# Patient Record
Sex: MALE | Race: WHITE | NOT HISPANIC OR LATINO
[De-identification: names, ages, dates, MRNs, and addresses within clinical notes are randomized per-mention and may not be internally consistent; named-entity substitution may affect disease eponyms.]

---

## 2022-08-17 ENCOUNTER — NON-APPOINTMENT (OUTPATIENT)
Age: 77
End: 2022-08-17

## 2022-10-21 ENCOUNTER — INPATIENT (INPATIENT)
Facility: HOSPITAL | Age: 77
LOS: 4 days | Discharge: HOME | DRG: 191 | End: 2022-10-26
Attending: HOSPITALIST | Admitting: HOSPITALIST
Payer: MEDICARE

## 2022-10-21 VITALS
HEIGHT: 66 IN | RESPIRATION RATE: 22 BRPM | SYSTOLIC BLOOD PRESSURE: 178 MMHG | TEMPERATURE: 97 F | WEIGHT: 198.86 LBS | DIASTOLIC BLOOD PRESSURE: 82 MMHG | OXYGEN SATURATION: 95 % | HEART RATE: 70 BPM

## 2022-10-21 LAB
ALBUMIN SERPL ELPH-MCNC: 3.9 G/DL — SIGNIFICANT CHANGE UP (ref 3.3–5)
ALP SERPL-CCNC: 97 U/L — SIGNIFICANT CHANGE UP (ref 30–120)
ALT FLD-CCNC: 12 U/L DA — SIGNIFICANT CHANGE UP (ref 10–60)
ANION GAP SERPL CALC-SCNC: 10 MMOL/L — SIGNIFICANT CHANGE UP (ref 5–17)
APTT BLD: 29.3 SEC — SIGNIFICANT CHANGE UP (ref 27.5–35.5)
AST SERPL-CCNC: 12 U/L — SIGNIFICANT CHANGE UP (ref 10–40)
BASOPHILS # BLD AUTO: 0.1 K/UL — SIGNIFICANT CHANGE UP (ref 0–0.2)
BASOPHILS NFR BLD AUTO: 1.2 % — SIGNIFICANT CHANGE UP (ref 0–2)
BILIRUB SERPL-MCNC: 0.8 MG/DL — SIGNIFICANT CHANGE UP (ref 0.2–1.2)
BUN SERPL-MCNC: 21 MG/DL — SIGNIFICANT CHANGE UP (ref 7–23)
CALCIUM SERPL-MCNC: 9.1 MG/DL — SIGNIFICANT CHANGE UP (ref 8.4–10.5)
CHLORIDE SERPL-SCNC: 103 MMOL/L — SIGNIFICANT CHANGE UP (ref 96–108)
CO2 SERPL-SCNC: 29 MMOL/L — SIGNIFICANT CHANGE UP (ref 22–31)
CREAT SERPL-MCNC: 1.35 MG/DL — HIGH (ref 0.5–1.3)
EGFR: 54 ML/MIN/1.73M2 — LOW
EOSINOPHIL # BLD AUTO: 1.41 K/UL — HIGH (ref 0–0.5)
EOSINOPHIL NFR BLD AUTO: 16.8 % — HIGH (ref 0–6)
GLUCOSE SERPL-MCNC: 110 MG/DL — HIGH (ref 70–99)
HCT VFR BLD CALC: 40.3 % — SIGNIFICANT CHANGE UP (ref 39–50)
HGB BLD-MCNC: 13.3 G/DL — SIGNIFICANT CHANGE UP (ref 13–17)
IMM GRANULOCYTES NFR BLD AUTO: 0.1 % — SIGNIFICANT CHANGE UP (ref 0–0.9)
INR BLD: 1.1 RATIO — SIGNIFICANT CHANGE UP (ref 0.88–1.16)
LIDOCAIN IGE QN: 152 U/L — SIGNIFICANT CHANGE UP (ref 73–393)
LYMPHOCYTES # BLD AUTO: 1.93 K/UL — SIGNIFICANT CHANGE UP (ref 1–3.3)
LYMPHOCYTES # BLD AUTO: 23 % — SIGNIFICANT CHANGE UP (ref 13–44)
MCHC RBC-ENTMCNC: 30.5 PG — SIGNIFICANT CHANGE UP (ref 27–34)
MCHC RBC-ENTMCNC: 33 GM/DL — SIGNIFICANT CHANGE UP (ref 32–36)
MCV RBC AUTO: 92.4 FL — SIGNIFICANT CHANGE UP (ref 80–100)
MONOCYTES # BLD AUTO: 0.68 K/UL — SIGNIFICANT CHANGE UP (ref 0–0.9)
MONOCYTES NFR BLD AUTO: 8.1 % — SIGNIFICANT CHANGE UP (ref 2–14)
NEUTROPHILS # BLD AUTO: 4.27 K/UL — SIGNIFICANT CHANGE UP (ref 1.8–7.4)
NEUTROPHILS NFR BLD AUTO: 50.8 % — SIGNIFICANT CHANGE UP (ref 43–77)
NRBC # BLD: 0 /100 WBCS — SIGNIFICANT CHANGE UP (ref 0–0)
NT-PROBNP SERPL-SCNC: 962 PG/ML — HIGH (ref 0–450)
PLATELET # BLD AUTO: 194 K/UL — SIGNIFICANT CHANGE UP (ref 150–400)
POTASSIUM SERPL-MCNC: 3.8 MMOL/L — SIGNIFICANT CHANGE UP (ref 3.5–5.3)
POTASSIUM SERPL-SCNC: 3.8 MMOL/L — SIGNIFICANT CHANGE UP (ref 3.5–5.3)
PROT SERPL-MCNC: 7.8 G/DL — SIGNIFICANT CHANGE UP (ref 6–8.3)
PROTHROM AB SERPL-ACNC: 12.7 SEC — SIGNIFICANT CHANGE UP (ref 10.5–13.4)
RBC # BLD: 4.36 M/UL — SIGNIFICANT CHANGE UP (ref 4.2–5.8)
RBC # FLD: 14 % — SIGNIFICANT CHANGE UP (ref 10.3–14.5)
SARS-COV-2 RNA SPEC QL NAA+PROBE: SIGNIFICANT CHANGE UP
SODIUM SERPL-SCNC: 142 MMOL/L — SIGNIFICANT CHANGE UP (ref 135–145)
TROPONIN I, HIGH SENSITIVITY RESULT: 25.6 NG/L — SIGNIFICANT CHANGE UP
TROPONIN I, HIGH SENSITIVITY RESULT: 27.7 NG/L — SIGNIFICANT CHANGE UP
WBC # BLD: 8.4 K/UL — SIGNIFICANT CHANGE UP (ref 3.8–10.5)
WBC # FLD AUTO: 8.4 K/UL — SIGNIFICANT CHANGE UP (ref 3.8–10.5)

## 2022-10-21 PROCEDURE — 93010 ELECTROCARDIOGRAM REPORT: CPT

## 2022-10-21 PROCEDURE — 99285 EMERGENCY DEPT VISIT HI MDM: CPT | Mod: CS

## 2022-10-21 PROCEDURE — 71045 X-RAY EXAM CHEST 1 VIEW: CPT | Mod: 26

## 2022-10-21 NOTE — ED PROVIDER NOTE - OBJECTIVE STATEMENT
76-year-old male history of CAD with stents pacemaker on Plavix complaining about 2 to 3 days of dyspnea on exertion.  Moderate to severe.  States he is unable to walk without getting short of breath now.  No fever no chills no upper respiratory symptoms.  No chest pain.

## 2022-10-21 NOTE — ED PROVIDER NOTE - PHYSICAL EXAMINATION
Gen: Alert, NAD  Head/eyes: NC/AT, PERRL  ENT: airway patent  Neck: supple  Pulm/lung: Bilateral clear BS  CV/heart: RRR  GI/Abd: soft, NT/ND, +BS, no guarding/rebound tenderness  Musculoskeletal: no edema/erythema/cyanosis  Skin: no rash  Neuro: AAOx3, grossly intact Yes

## 2022-10-21 NOTE — ED ADULT NURSE NOTE - OBJECTIVE STATEMENT
77 yo M pmh of COPD, bundle branch block with pacemaker ambulated to ED c/o difficulty breathing.  Pt c/o worsening SOB x 2-3 days.  Pt states that he is unable to tolerate normal activities since onset of symptoms.  Denies CP, back pain, fevers/chills, n/v/d, lightheadedness, dizziness, changes in urinary or bowel habits.  A&Ox4, EKG performed, placed on CM, tele tech aware.  Pt mildly tachypneic on exam, with noted moist, non-productive cough.  skin w/d/i, noted +peripheral pulses.  VSS except for HTN.  Safety maintained, bed in lowest position, side rails raised. IV established, and lab specimens sent.  pending results.

## 2022-10-21 NOTE — ED PROVIDER NOTE - CARE PLAN
Dulaglutide (By injection)   Dulaglutide (doo-la-GLOO-tide)  Treats type 2 diabetes. Brand Name(s): Trulicity   There may be other brand names for this medicine. When This Medicine Should Not Be Used: This medicine is not right for everyone. Do not use it if you had an allergic reaction to dulaglutide, you have multiple endocrine neoplasia syndrome type 2 (MEN 2), or you or anyone in your family has had medullary thyroid cancer. How to Use This Medicine:   Injectable  · Your doctor will prescribe your exact dose. This medicine is usually given once a week, on the same day of the week. It is given as a shot under the skin of your stomach, thighs, or upper arms. · You may be taught how to give your medicine at home. Make sure you understand all instructions before giving yourself an injection. Do not use more medicine or use it more often than your doctor tells you to. · If you use insulin in addition to this medicine, do not mix them in the same syringe. You may give the shots in the same area (such as your stomach), but do not give the shots right next to each other. · If the medicine in the pen or prefilled syringe has changed color, looks cloudy, or has particles in it, do not use it. · You will be shown the body areas where this shot can be given. Use a different body area each time you give yourself a shot. Keep track of where you give each shot to make sure you rotate body areas. · Use a new needle and syringe each time you inject your medicine. · This medicine should come with a Medication Guide. Ask your pharmacist for a copy if you do not have one. · Missed dose: Use a missed dose as soon as possible if there are at least 3 days before your next dose is due. If your next dose is due in less than 3 days, then skip the missed dose. · Store your medicine pens or prefilled syringes in the refrigerator and keep them in the original carton. Protect the pens from light.  You may also store the pens at room temperature for up to 14 days. Do not freeze the medicine, and do not use the medicine if it has been frozen. Drugs and Foods to Avoid:      Ask your doctor or pharmacist before using any other medicine, including over-the-counter medicines, vitamins, and herbal products. Warnings While Using This Medicine:   · Tell your doctor if you are pregnant or breastfeeding, or if you have kidney disease or a history of pancreas problems. Tell your doctor if you have severe digestion problems (such as gastroparesis) or stomach or bowel problems. · This medicine may cause the following problems:  ¨ Increased risk of thyroid tumor  ¨ Pancreatitis  ¨ Low blood sugar (more likely if you also take insulin or other diabetes medicine)  · Your doctor will do lab tests at regular visits to check on the effects of this medicine. Keep all appointments. · Keep all medicine out of the reach of children. Never share your medicine with anyone. Do not share needles or pens because you can spread an infection. Possible Side Effects While Using This Medicine:   Call your doctor right away if you notice any of these side effects:  · Allergic reaction: Itching or hives, swelling in your face or hands, swelling or tingling in your mouth or throat, chest tightness, trouble breathing  · A lump or swelling in your neck, trouble breathing or swallowing  · Change in how much or how often you urinate  · Shaking, trembling, sweating, fast or pounding heartbeat, lightheadedness, hunger, confusion  · Sudden and severe stomach pain, nausea, vomiting, fever, and lightheadedness  If you notice these less serious side effects, talk with your doctor:   · Diarrhea  · Redness, itching, swelling, or any changes in your skin where the shot was given  If you notice other side effects that you think are caused by this medicine, tell your doctor. Call your doctor for medical advice about side effects.  You may report side effects to FDA at 1-800-FDA-1088  © 2017 [facility] Information is for End User's use only and may not be sold, redistributed or otherwise used for commercial purposes. The above information is an  only. It is not intended as medical advice for individual conditions or treatments. Talk to your doctor, nurse or pharmacist before following any medical regimen to see if it is safe and effective for you. 1 Principal Discharge DX:	Dyspnea on exertion

## 2022-10-21 NOTE — ED PROVIDER NOTE - NS ED ROS FT
Constitutional: - Fever, - Chills, - Anorexia, - Fatigue, - Night sweats  Eyes: - Discharge, - Irritation, - Redness, - Visual changes, - Light sensitivity, - Pain  EARS: - Ear Pain, - Tinnitus, - Decreased hearing  NOSE: - Congestion, - Epistaxis  MOUTH/THROAT: - Vocal Changes, - Drooling, - Sore throat  NECK: - Lumps, - Stiffness, - Pain  CV: - Palpitations, - Chest Pain, - Edema, - Syncope  RESP:  - Cough, + Shortness of Breath, - Dyspnea on Exertion, - Trouble speaking, - Pleuritic pain - Wheezing  GI: - Diarrhea, - Constipation, - Bloody stools, - Nausea, - Vomiting, - Abdominal Pain  : - Dysuria, -Frequency, - Hematuria, - Hesitancy, - Incontinence, - Saddle Anesthesia, - Abnormal discharge  MSK: - Myalgias, - Arthralgias, - Weakness, - Deformities, - Injuries  SKIN: - Color change, - Rash, - Swelling, - Ecchymosis, - Abrasion, - laceration  NEURO: - Change in behavior, - Dec. Alertness, - Headache, - Dizziness, - Change in speech, - Weakness, - Seizure-like activity, - Difficulty ambulating

## 2022-10-22 DIAGNOSIS — R06.09 OTHER FORMS OF DYSPNEA: ICD-10-CM

## 2022-10-22 DIAGNOSIS — Z95.0 PRESENCE OF CARDIAC PACEMAKER: Chronic | ICD-10-CM

## 2022-10-22 DIAGNOSIS — Z90.49 ACQUIRED ABSENCE OF OTHER SPECIFIED PARTS OF DIGESTIVE TRACT: Chronic | ICD-10-CM

## 2022-10-22 LAB
A1C WITH ESTIMATED AVERAGE GLUCOSE RESULT: 5.9 % — HIGH (ref 4–5.6)
ALBUMIN SERPL ELPH-MCNC: 2.9 G/DL — LOW (ref 3.3–5)
ALP SERPL-CCNC: 82 U/L — SIGNIFICANT CHANGE UP (ref 30–120)
ALT FLD-CCNC: <10 U/L DA — LOW (ref 10–60)
ANION GAP SERPL CALC-SCNC: 10 MMOL/L — SIGNIFICANT CHANGE UP (ref 5–17)
AST SERPL-CCNC: 11 U/L — SIGNIFICANT CHANGE UP (ref 10–40)
BASOPHILS # BLD AUTO: 0.02 K/UL — SIGNIFICANT CHANGE UP (ref 0–0.2)
BASOPHILS NFR BLD AUTO: 0.4 % — SIGNIFICANT CHANGE UP (ref 0–2)
BILIRUB SERPL-MCNC: 0.6 MG/DL — SIGNIFICANT CHANGE UP (ref 0.2–1.2)
BUN SERPL-MCNC: 20 MG/DL — SIGNIFICANT CHANGE UP (ref 7–23)
CALCIUM SERPL-MCNC: 8.5 MG/DL — SIGNIFICANT CHANGE UP (ref 8.4–10.5)
CHLORIDE SERPL-SCNC: 101 MMOL/L — SIGNIFICANT CHANGE UP (ref 96–108)
CO2 SERPL-SCNC: 25 MMOL/L — SIGNIFICANT CHANGE UP (ref 22–31)
CREAT SERPL-MCNC: 1.06 MG/DL — SIGNIFICANT CHANGE UP (ref 0.5–1.3)
CRP SERPL-MCNC: 14 MG/L — HIGH
CRP SERPL-MCNC: 16 MG/L — HIGH
EGFR: 72 ML/MIN/1.73M2 — SIGNIFICANT CHANGE UP
EOSINOPHIL # BLD AUTO: 0.01 K/UL — SIGNIFICANT CHANGE UP (ref 0–0.5)
EOSINOPHIL NFR BLD AUTO: 0.2 % — SIGNIFICANT CHANGE UP (ref 0–6)
ERYTHROCYTE [SEDIMENTATION RATE] IN BLOOD: 21 MM/HR — HIGH (ref 0–9)
ESTIMATED AVERAGE GLUCOSE: 123 MG/DL — HIGH (ref 68–114)
GLUCOSE SERPL-MCNC: 156 MG/DL — HIGH (ref 70–99)
HCT VFR BLD CALC: 38.6 % — LOW (ref 39–50)
HGB BLD-MCNC: 12.5 G/DL — LOW (ref 13–17)
IMM GRANULOCYTES NFR BLD AUTO: 0.4 % — SIGNIFICANT CHANGE UP (ref 0–0.9)
LYMPHOCYTES # BLD AUTO: 0.59 K/UL — LOW (ref 1–3.3)
LYMPHOCYTES # BLD AUTO: 12.9 % — LOW (ref 13–44)
MAGNESIUM SERPL-MCNC: 1.6 MG/DL — SIGNIFICANT CHANGE UP (ref 1.6–2.6)
MCHC RBC-ENTMCNC: 29.6 PG — SIGNIFICANT CHANGE UP (ref 27–34)
MCHC RBC-ENTMCNC: 32.4 GM/DL — SIGNIFICANT CHANGE UP (ref 32–36)
MCV RBC AUTO: 91.5 FL — SIGNIFICANT CHANGE UP (ref 80–100)
MONOCYTES # BLD AUTO: 0.04 K/UL — SIGNIFICANT CHANGE UP (ref 0–0.9)
MONOCYTES NFR BLD AUTO: 0.9 % — LOW (ref 2–14)
NEUTROPHILS # BLD AUTO: 3.89 K/UL — SIGNIFICANT CHANGE UP (ref 1.8–7.4)
NEUTROPHILS NFR BLD AUTO: 85.2 % — HIGH (ref 43–77)
NRBC # BLD: 0 /100 WBCS — SIGNIFICANT CHANGE UP (ref 0–0)
PHOSPHATE SERPL-MCNC: 2 MG/DL — LOW (ref 2.5–4.5)
PLATELET # BLD AUTO: 181 K/UL — SIGNIFICANT CHANGE UP (ref 150–400)
POTASSIUM SERPL-MCNC: 4.1 MMOL/L — SIGNIFICANT CHANGE UP (ref 3.5–5.3)
POTASSIUM SERPL-SCNC: 4.1 MMOL/L — SIGNIFICANT CHANGE UP (ref 3.5–5.3)
PROCALCITONIN SERPL-MCNC: 0.08 NG/ML — SIGNIFICANT CHANGE UP (ref 0.02–0.1)
PROCALCITONIN SERPL-MCNC: 0.08 NG/ML — SIGNIFICANT CHANGE UP (ref 0.02–0.1)
PROT SERPL-MCNC: 6.2 G/DL — SIGNIFICANT CHANGE UP (ref 6–8.3)
RAPID RVP RESULT: SIGNIFICANT CHANGE UP
RBC # BLD: 4.22 M/UL — SIGNIFICANT CHANGE UP (ref 4.2–5.8)
RBC # FLD: 13.9 % — SIGNIFICANT CHANGE UP (ref 10.3–14.5)
SARS-COV-2 RNA SPEC QL NAA+PROBE: SIGNIFICANT CHANGE UP
SODIUM SERPL-SCNC: 136 MMOL/L — SIGNIFICANT CHANGE UP (ref 135–145)
TROPONIN I, HIGH SENSITIVITY RESULT: 23.7 NG/L — SIGNIFICANT CHANGE UP
WBC # BLD: 4.57 K/UL — SIGNIFICANT CHANGE UP (ref 3.8–10.5)
WBC # FLD AUTO: 4.57 K/UL — SIGNIFICANT CHANGE UP (ref 3.8–10.5)

## 2022-10-22 PROCEDURE — 71275 CT ANGIOGRAPHY CHEST: CPT | Mod: 26

## 2022-10-22 PROCEDURE — 12345: CPT | Mod: NC

## 2022-10-22 PROCEDURE — 99223 1ST HOSP IP/OBS HIGH 75: CPT | Mod: AI

## 2022-10-22 RX ORDER — ASPIRIN/CALCIUM CARB/MAGNESIUM 324 MG
1 TABLET ORAL
Qty: 0 | Refills: 0 | DISCHARGE

## 2022-10-22 RX ORDER — ENOXAPARIN SODIUM 100 MG/ML
40 INJECTION SUBCUTANEOUS EVERY 24 HOURS
Refills: 0 | Status: DISCONTINUED | OUTPATIENT
Start: 2022-10-22 | End: 2022-10-26

## 2022-10-22 RX ORDER — TIOTROPIUM BROMIDE 18 UG/1
1 CAPSULE ORAL; RESPIRATORY (INHALATION) DAILY
Refills: 0 | Status: DISCONTINUED | OUTPATIENT
Start: 2022-10-22 | End: 2022-10-26

## 2022-10-22 RX ORDER — DEXTROSE 50 % IN WATER 50 %
25 SYRINGE (ML) INTRAVENOUS ONCE
Refills: 0 | Status: DISCONTINUED | OUTPATIENT
Start: 2022-10-22 | End: 2022-10-26

## 2022-10-22 RX ORDER — GLUCAGON INJECTION, SOLUTION 0.5 MG/.1ML
1 INJECTION, SOLUTION SUBCUTANEOUS ONCE
Refills: 0 | Status: DISCONTINUED | OUTPATIENT
Start: 2022-10-22 | End: 2022-10-26

## 2022-10-22 RX ORDER — IPRATROPIUM/ALBUTEROL SULFATE 18-103MCG
3 AEROSOL WITH ADAPTER (GRAM) INHALATION ONCE
Refills: 0 | Status: COMPLETED | OUTPATIENT
Start: 2022-10-22 | End: 2022-10-22

## 2022-10-22 RX ORDER — ASPIRIN/CALCIUM CARB/MAGNESIUM 324 MG
81 TABLET ORAL DAILY
Refills: 0 | Status: DISCONTINUED | OUTPATIENT
Start: 2022-10-22 | End: 2022-10-26

## 2022-10-22 RX ORDER — ALBUTEROL 90 UG/1
2 AEROSOL, METERED ORAL
Qty: 0 | Refills: 0 | DISCHARGE

## 2022-10-22 RX ORDER — CEFTRIAXONE 500 MG/1
INJECTION, POWDER, FOR SOLUTION INTRAMUSCULAR; INTRAVENOUS
Refills: 0 | Status: DISCONTINUED | OUTPATIENT
Start: 2022-10-22 | End: 2022-10-22

## 2022-10-22 RX ORDER — SODIUM CHLORIDE 9 MG/ML
1000 INJECTION, SOLUTION INTRAVENOUS
Refills: 0 | Status: DISCONTINUED | OUTPATIENT
Start: 2022-10-22 | End: 2022-10-26

## 2022-10-22 RX ORDER — ONDANSETRON 8 MG/1
4 TABLET, FILM COATED ORAL EVERY 8 HOURS
Refills: 0 | Status: DISCONTINUED | OUTPATIENT
Start: 2022-10-22 | End: 2022-10-26

## 2022-10-22 RX ORDER — BUDESONIDE AND FORMOTEROL FUMARATE DIHYDRATE 160; 4.5 UG/1; UG/1
2 AEROSOL RESPIRATORY (INHALATION)
Refills: 0 | Status: DISCONTINUED | OUTPATIENT
Start: 2022-10-22 | End: 2022-10-26

## 2022-10-22 RX ORDER — CEFTRIAXONE 500 MG/1
1000 INJECTION, POWDER, FOR SOLUTION INTRAMUSCULAR; INTRAVENOUS ONCE
Refills: 0 | Status: COMPLETED | OUTPATIENT
Start: 2022-10-22 | End: 2022-10-22

## 2022-10-22 RX ORDER — AMLODIPINE BESYLATE 2.5 MG/1
5 TABLET ORAL DAILY
Refills: 0 | Status: DISCONTINUED | OUTPATIENT
Start: 2022-10-22 | End: 2022-10-26

## 2022-10-22 RX ORDER — METFORMIN HYDROCHLORIDE 850 MG/1
1 TABLET ORAL
Qty: 0 | Refills: 0 | DISCHARGE

## 2022-10-22 RX ORDER — CLOPIDOGREL BISULFATE 75 MG/1
1 TABLET, FILM COATED ORAL
Qty: 0 | Refills: 0 | DISCHARGE

## 2022-10-22 RX ORDER — FLUTICASONE PROPIONATE 220 MCG
1 AEROSOL WITH ADAPTER (GRAM) INHALATION
Qty: 0 | Refills: 0 | DISCHARGE

## 2022-10-22 RX ORDER — LANOLIN ALCOHOL/MO/W.PET/CERES
3 CREAM (GRAM) TOPICAL AT BEDTIME
Refills: 0 | Status: DISCONTINUED | OUTPATIENT
Start: 2022-10-22 | End: 2022-10-26

## 2022-10-22 RX ORDER — ASCORBIC ACID 60 MG
1 TABLET,CHEWABLE ORAL
Qty: 0 | Refills: 0 | DISCHARGE

## 2022-10-22 RX ORDER — DEXTROSE 50 % IN WATER 50 %
12.5 SYRINGE (ML) INTRAVENOUS ONCE
Refills: 0 | Status: DISCONTINUED | OUTPATIENT
Start: 2022-10-22 | End: 2022-10-26

## 2022-10-22 RX ORDER — ALBUTEROL 90 UG/1
2.5 AEROSOL, METERED ORAL EVERY 8 HOURS
Refills: 0 | Status: DISCONTINUED | OUTPATIENT
Start: 2022-10-22 | End: 2022-10-26

## 2022-10-22 RX ORDER — DEXTROSE 50 % IN WATER 50 %
15 SYRINGE (ML) INTRAVENOUS ONCE
Refills: 0 | Status: DISCONTINUED | OUTPATIENT
Start: 2022-10-22 | End: 2022-10-26

## 2022-10-22 RX ORDER — VALSARTAN 80 MG/1
80 TABLET ORAL DAILY
Refills: 0 | Status: DISCONTINUED | OUTPATIENT
Start: 2022-10-22 | End: 2022-10-22

## 2022-10-22 RX ORDER — OMEGA-3 ACID ETHYL ESTERS 1 G
2 CAPSULE ORAL DAILY
Refills: 0 | Status: DISCONTINUED | OUTPATIENT
Start: 2022-10-22 | End: 2022-10-26

## 2022-10-22 RX ORDER — ACETAMINOPHEN 500 MG
650 TABLET ORAL EVERY 6 HOURS
Refills: 0 | Status: DISCONTINUED | OUTPATIENT
Start: 2022-10-22 | End: 2022-10-26

## 2022-10-22 RX ORDER — BUDESONIDE, GLYCOPYRROLATE, AND FORMOTEROL FUMARATE 160; 9; 4.8 UG/1; UG/1; UG/1
2 AEROSOL, METERED RESPIRATORY (INHALATION)
Qty: 0 | Refills: 0 | DISCHARGE

## 2022-10-22 RX ORDER — SODIUM CHLORIDE 9 MG/ML
1000 INJECTION, SOLUTION INTRAVENOUS
Refills: 0 | Status: DISCONTINUED | OUTPATIENT
Start: 2022-10-22 | End: 2022-10-25

## 2022-10-22 RX ORDER — OMEGA-3 ACID ETHYL ESTERS 1 G
1 CAPSULE ORAL
Qty: 0 | Refills: 0 | DISCHARGE

## 2022-10-22 RX ORDER — CLOPIDOGREL BISULFATE 75 MG/1
75 TABLET, FILM COATED ORAL DAILY
Refills: 0 | Status: DISCONTINUED | OUTPATIENT
Start: 2022-10-22 | End: 2022-10-26

## 2022-10-22 RX ORDER — IPRATROPIUM/ALBUTEROL SULFATE 18-103MCG
3 AEROSOL WITH ADAPTER (GRAM) INHALATION EVERY 6 HOURS
Refills: 0 | Status: DISCONTINUED | OUTPATIENT
Start: 2022-10-22 | End: 2022-10-22

## 2022-10-22 RX ORDER — RAMELTEON 8 MG
1 TABLET ORAL
Qty: 0 | Refills: 0 | DISCHARGE

## 2022-10-22 RX ORDER — INSULIN LISPRO 100/ML
VIAL (ML) SUBCUTANEOUS
Refills: 0 | Status: DISCONTINUED | OUTPATIENT
Start: 2022-10-22 | End: 2022-10-26

## 2022-10-22 RX ORDER — INSULIN LISPRO 100/ML
VIAL (ML) SUBCUTANEOUS AT BEDTIME
Refills: 0 | Status: DISCONTINUED | OUTPATIENT
Start: 2022-10-22 | End: 2022-10-26

## 2022-10-22 RX ORDER — AMLODIPINE BESYLATE 2.5 MG/1
1 TABLET ORAL
Qty: 0 | Refills: 0 | DISCHARGE

## 2022-10-22 RX ORDER — ASCORBIC ACID 60 MG
500 TABLET,CHEWABLE ORAL DAILY
Refills: 0 | Status: DISCONTINUED | OUTPATIENT
Start: 2022-10-22 | End: 2022-10-26

## 2022-10-22 RX ADMIN — Medication 20 MILLIGRAM(S): at 12:52

## 2022-10-22 RX ADMIN — Medication 1: at 12:50

## 2022-10-22 RX ADMIN — Medication 125 MILLIGRAM(S): at 00:09

## 2022-10-22 RX ADMIN — ALBUTEROL 2.5 MILLIGRAM(S): 90 AEROSOL, METERED ORAL at 15:24

## 2022-10-22 RX ADMIN — Medication 1: at 08:15

## 2022-10-22 RX ADMIN — ENOXAPARIN SODIUM 40 MILLIGRAM(S): 100 INJECTION SUBCUTANEOUS at 05:14

## 2022-10-22 RX ADMIN — Medication 2 GRAM(S): at 12:53

## 2022-10-22 RX ADMIN — Medication 1 TABLET(S): at 12:52

## 2022-10-22 RX ADMIN — CLOPIDOGREL BISULFATE 75 MILLIGRAM(S): 75 TABLET, FILM COATED ORAL at 12:51

## 2022-10-22 RX ADMIN — ALBUTEROL 2.5 MILLIGRAM(S): 90 AEROSOL, METERED ORAL at 23:21

## 2022-10-22 RX ADMIN — Medication 40 MILLIGRAM(S): at 05:14

## 2022-10-22 RX ADMIN — AMLODIPINE BESYLATE 5 MILLIGRAM(S): 2.5 TABLET ORAL at 05:14

## 2022-10-22 RX ADMIN — Medication 500 MILLIGRAM(S): at 12:52

## 2022-10-22 RX ADMIN — Medication 81 MILLIGRAM(S): at 12:52

## 2022-10-22 RX ADMIN — Medication 3 MILLILITER(S): at 00:12

## 2022-10-22 RX ADMIN — SODIUM CHLORIDE 75 MILLILITER(S): 9 INJECTION, SOLUTION INTRAVENOUS at 05:13

## 2022-10-22 RX ADMIN — BUDESONIDE AND FORMOTEROL FUMARATE DIHYDRATE 2 PUFF(S): 160; 4.5 AEROSOL RESPIRATORY (INHALATION) at 21:55

## 2022-10-22 RX ADMIN — CEFTRIAXONE 100 MILLIGRAM(S): 500 INJECTION, POWDER, FOR SOLUTION INTRAMUSCULAR; INTRAVENOUS at 01:10

## 2022-10-22 NOTE — H&P ADULT - ASSESSMENT
76M with hx of COPD/emphysema, CAD s/p 2x stents, bradycardia s/p PPM, HTN, DM2 not on insulin who presents with SOB, ARNOLD, and wheezing.  Consistent with COPD exacerbation.      COPD exacerbation  - admitted to remote telemetry  - monitor O2 sat  - nebs PRN  - cont with steroids  - pulmonary consult  - check ESR, CRP, procalcitonin  - start ceftriaxone  - f/u cultures    Slight SHARI  - hold valsartan-HCTZ for now  - gentle hydration  - repeat BMP    CAD   - cont with plavix, ASA, amlodipine, atorvastatin  - holding valsartan-HCTZ    DM2   - hold metformin  - start low dose insulin coverage scale with FS qAC and qHS  - monitor glucose anderson while on steroids  - diabetic diet    Depression  - cont with paxil    Preventive measures  - lovenox for DVT ppx

## 2022-10-22 NOTE — H&P ADULT - NSHPREVIEWOFSYSTEMS_GEN_ALL_CORE
REVIEW OF SYSTEMS:  CONSTITUTIONAL:    no fever or weight loss or fatigue  EYES:    no eye pain or visual disturbances or discharge  ENMT:     no difficulty hearing or tinnitus or vertigo, no sinus or throat pain  NECK:    no pain or stiffness  RESPIRATORY:    +SOB/ARNOLD, +wheezing, no chills or hemoptysis, no shortness of breath  CARDIOVASCULAR:    no chest pain or palpitations or dizziness or leg swelling  GASTROINTESTINAL:    no abdominal or epigastric pain. no nausea or vomiting or hematemesis, no diarrhea or constipation. no melena or hematochezia.  GENITOURINARY:    no dysuria or frequency or hematuria or incontinence  NEUROLOGICAL:    no headaches or memory loss or loss of strength or numbness or tremors  SKIN:    no itching or burning or rashes or lesions   LYMPH NODES:    no enlarged glands  ENDOCRINE:    no heat or cold intolerance, no hair loss, no polydipsia or polyuria  MUSCULOSKELETAL:    no joint pain or swelling, no muscle or back or extremity pain  PSYCHIATRIC:    no depression or anxiety or mood swings or difficulty sleeping  HEME/LYMPH:    no easy bruising or bleeding gums  ALLERGY AND IMMUNOLOGIC:    no hives or eczema

## 2022-10-22 NOTE — PATIENT PROFILE ADULT - FALL HARM RISK - HARM RISK INTERVENTIONS

## 2022-10-22 NOTE — H&P ADULT - HISTORY OF PRESENT ILLNESS
76M with hx of COPD/emphysema, CAD s/p 2x stents, bradycardia s/p PPM, HTN, DM2 not on insulin who presents with SOB, ARNOLD, and wheezing.  Symptoms started today with the patient wheezing with worsening ARNOLD (has chronic ARNOLD).  Patient was able to get some albuterol inhaler  76M with hx of COPD/emphysema, CAD s/p 2x stents, bradycardia s/p PPM, HTN, DM2 not on insulin who presents with SOB, ARNOLD, and wheezing.  Symptoms started today with the patient wheezing with worsening ARNOLD (has chronic ARNOLD).  Patient had a pulse ox that showed as low as 86%.  Patient was able to get some albuterol inhaler which improved his symptoms.  However his breathing worsened after he finished the albuterol.  Decided to come here for further evaluation.  Patient denied any fevers, nasuea/vomiting/diarrhea.  Has chronic phelgm but has not worsened.  No sick contacts.  No recent travel.  No history of COPD exacerbation.  Patient denied getting his flu vaccine but did receive his COVID series plus one booster.  In the ED, patient's BP was 178/82, HR 70  RR 22, 95%, and T 97.4F.  Labs showed only slight SHARI at Cr 1.35 and .  Troponins neg x3.  COVID neg.  Patient started to wheeze again in the ED and therefore patient is being admitted for possible COPD exacerbation.  Currently feels better s/p neb treatment.

## 2022-10-22 NOTE — H&P ADULT - NSICDXPASTMEDICALHX_GEN_ALL_CORE_FT
PAST MEDICAL HISTORY:  Borderline type 2 diabetes mellitus     CAD (coronary artery disease) s/p 2 stents    COPD, moderate     Emphysema/COPD     H/O bundle branch block     Hypertension     Pacemaker

## 2022-10-22 NOTE — H&P ADULT - NSHPLABSRESULTS_GEN_ALL_CORE
LABS:                        13.3   8.40  )-----------( 194      ( 21 Oct 2022 22:07 )             40.3     142    |  103    |  21     ----------------------------<  110<H>    21 Oct 2022 22:07  3.8     |  29     |  1.35<H>        Ca 9.1           21 Oct 2022 22:07        TPro  7.8    /  Alb  3.9    /  TBili  0.8    /  DBili  x      /  AST  12     /  ALT  12     /  AlkPhos  97     21 Oct 2022 22:07    PT/INR - ( 21 Oct 2022 22:07 )   PT: 12.7 ;   INR: 1.10          PTT - ( 21 Oct 2022 22:07 )  PTT:29.3     Troponin trend:  Troponin I, High Sensitivity Result: 23.7 ng/L (10-22-22 @ 00:07)  Troponin I, High Sensitivity Result: 25.6 ng/L (10-21-22 @ 23:09)  Troponin I, High Sensitivity Result: 27.7 ng/L (10-21-22 @ 22:07)    EKG:  a-paced with PVCs  Radiology:  CXR- grossly clear

## 2022-10-22 NOTE — H&P ADULT - NSICDXFAMILYHX_GEN_ALL_CORE_FT
FAMILY HISTORY:  Father  Still living? Unknown  FH: emphysema, Age at diagnosis: Age Unknown    Mother  Still living? Unknown  FH: lung cancer, Age at diagnosis: Age Unknown

## 2022-10-22 NOTE — CHART NOTE - NSCHARTNOTEFT_GEN_A_CORE
Agree with Dr. Campbell    76M with hx of COPD/emphysema, CAD s/p 2x stents, bradycardia s/p PPM, HTN, DM2 not on insulin who presents with SOB, ARNOLD, and wheezing.  Consistent with COPD exacerbation.      COPD exacerbation  - admitted to remote telemetry  - monitor O2 sat  - nebs PRN  - cont with steroids  - pulmonary consult  - check ESR, CRP, procalcitonin  - continue ceftriaxone  - f/u cultures    Slight SHARI  - hold valsartan-HCTZ for now  - gentle hydration  - repeat BMP    CAD   - cont with plavix, ASA, amlodipine, atorvastatin  - holding valsartan-HCTZ    DM2   - hold metformin  - start low dose insulin coverage scale with FS qAC and qHS  - monitor glucose anderson while on steroids  - diabetic diet    Depression  - cont with paxil    Preventive measures  - lovenox for DVT ppx

## 2022-10-22 NOTE — H&P ADULT - NSHPPHYSICALEXAM_GEN_ALL_CORE
PHYSICAL EXAM:  Vital Signs Last 24 Hrs  T(C): 36.3 (21 Oct 2022 21:43), Max: 36.3 (21 Oct 2022 21:43)  T(F): 97.4 (21 Oct 2022 21:43), Max: 97.4 (21 Oct 2022 21:43)  HR: 94 (22 Oct 2022 00:30) (60 - 95)  BP: 168/79 (21 Oct 2022 23:28) (168/79 - 178/82)  BP(mean): --  RR: 26 (22 Oct 2022 00:05) (20 - 26)  SpO2: 98% (22 Oct 2022 00:30) (93% - 98%)    Parameters below as of 22 Oct 2022 00:30  Patient On (Oxygen Delivery Method): room air        GENERAL:     well-appearing male in NAD  HEAD:     atraumatic, normocephalic  EYES:     EOMI, conjunctiva and sclera clear  RESPIRATORY:     diffuse exp wheezing, no crackles or rales, no acecssory muscle use  CARDIOVASCULAR:     regular rate and rhythm, no murmurs or rubs or gallops, 2+ peripheral pulses  GASTROINTESTINAL:     soft, nontender, nondistended,  bowel sounds present  EXTREMITIES:     no clubbing or cyanosis or edema  MUSCULOSKELETAL:     no joint pain or swelling or deformities  NERVOUS SYSTEM:     motor strength intact with 5/5 B/L upper and lower extremities, no gross sensory deficits  SKIN:     no rashes or lesions  PSYCH:     appropriate, alert and orientated x3, good concentration
spouse

## 2022-10-23 LAB
ALBUMIN SERPL ELPH-MCNC: 3.4 G/DL — SIGNIFICANT CHANGE UP (ref 3.3–5)
ALP SERPL-CCNC: 81 U/L — SIGNIFICANT CHANGE UP (ref 30–120)
ALT FLD-CCNC: 12 U/L DA — SIGNIFICANT CHANGE UP (ref 10–60)
ANION GAP SERPL CALC-SCNC: 12 MMOL/L — SIGNIFICANT CHANGE UP (ref 5–17)
AST SERPL-CCNC: 17 U/L — SIGNIFICANT CHANGE UP (ref 10–40)
BILIRUB SERPL-MCNC: 0.3 MG/DL — SIGNIFICANT CHANGE UP (ref 0.2–1.2)
BUN SERPL-MCNC: 32 MG/DL — HIGH (ref 7–23)
CALCIUM SERPL-MCNC: 8.8 MG/DL — SIGNIFICANT CHANGE UP (ref 8.4–10.5)
CHLORIDE SERPL-SCNC: 102 MMOL/L — SIGNIFICANT CHANGE UP (ref 96–108)
CO2 SERPL-SCNC: 26 MMOL/L — SIGNIFICANT CHANGE UP (ref 22–31)
CREAT SERPL-MCNC: 1.6 MG/DL — HIGH (ref 0.5–1.3)
EGFR: 44 ML/MIN/1.73M2 — LOW
GLUCOSE SERPL-MCNC: 206 MG/DL — HIGH (ref 70–99)
HCT VFR BLD CALC: 36.3 % — LOW (ref 39–50)
HGB BLD-MCNC: 12 G/DL — LOW (ref 13–17)
MCHC RBC-ENTMCNC: 29.9 PG — SIGNIFICANT CHANGE UP (ref 27–34)
MCHC RBC-ENTMCNC: 33.1 GM/DL — SIGNIFICANT CHANGE UP (ref 32–36)
MCV RBC AUTO: 90.5 FL — SIGNIFICANT CHANGE UP (ref 80–100)
NRBC # BLD: 0 /100 WBCS — SIGNIFICANT CHANGE UP (ref 0–0)
PLATELET # BLD AUTO: 182 K/UL — SIGNIFICANT CHANGE UP (ref 150–400)
POTASSIUM SERPL-MCNC: 3.7 MMOL/L — SIGNIFICANT CHANGE UP (ref 3.5–5.3)
POTASSIUM SERPL-SCNC: 3.7 MMOL/L — SIGNIFICANT CHANGE UP (ref 3.5–5.3)
PROT SERPL-MCNC: 6.9 G/DL — SIGNIFICANT CHANGE UP (ref 6–8.3)
RBC # BLD: 4.01 M/UL — LOW (ref 4.2–5.8)
RBC # FLD: 14.2 % — SIGNIFICANT CHANGE UP (ref 10.3–14.5)
SODIUM SERPL-SCNC: 140 MMOL/L — SIGNIFICANT CHANGE UP (ref 135–145)
WBC # BLD: 12.4 K/UL — HIGH (ref 3.8–10.5)
WBC # FLD AUTO: 12.4 K/UL — HIGH (ref 3.8–10.5)

## 2022-10-23 PROCEDURE — 99233 SBSQ HOSP IP/OBS HIGH 50: CPT

## 2022-10-23 RX ADMIN — CLOPIDOGREL BISULFATE 75 MILLIGRAM(S): 75 TABLET, FILM COATED ORAL at 11:12

## 2022-10-23 RX ADMIN — Medication 20 MILLIGRAM(S): at 11:12

## 2022-10-23 RX ADMIN — ALBUTEROL 2.5 MILLIGRAM(S): 90 AEROSOL, METERED ORAL at 14:38

## 2022-10-23 RX ADMIN — SODIUM CHLORIDE 75 MILLILITER(S): 9 INJECTION, SOLUTION INTRAVENOUS at 15:11

## 2022-10-23 RX ADMIN — Medication 81 MILLIGRAM(S): at 11:11

## 2022-10-23 RX ADMIN — BUDESONIDE AND FORMOTEROL FUMARATE DIHYDRATE 2 PUFF(S): 160; 4.5 AEROSOL RESPIRATORY (INHALATION) at 06:49

## 2022-10-23 RX ADMIN — Medication 50 MILLIGRAM(S): at 05:08

## 2022-10-23 RX ADMIN — BUDESONIDE AND FORMOTEROL FUMARATE DIHYDRATE 2 PUFF(S): 160; 4.5 AEROSOL RESPIRATORY (INHALATION) at 21:59

## 2022-10-23 RX ADMIN — Medication 2: at 12:42

## 2022-10-23 RX ADMIN — ALBUTEROL 2.5 MILLIGRAM(S): 90 AEROSOL, METERED ORAL at 08:32

## 2022-10-23 RX ADMIN — Medication 1 TABLET(S): at 11:12

## 2022-10-23 RX ADMIN — ALBUTEROL 2.5 MILLIGRAM(S): 90 AEROSOL, METERED ORAL at 23:10

## 2022-10-23 RX ADMIN — TIOTROPIUM BROMIDE 1 CAPSULE(S): 18 CAPSULE ORAL; RESPIRATORY (INHALATION) at 06:49

## 2022-10-23 RX ADMIN — Medication 2 GRAM(S): at 11:11

## 2022-10-23 RX ADMIN — Medication 500 MILLIGRAM(S): at 11:11

## 2022-10-23 RX ADMIN — AMLODIPINE BESYLATE 5 MILLIGRAM(S): 2.5 TABLET ORAL at 05:08

## 2022-10-23 RX ADMIN — ENOXAPARIN SODIUM 40 MILLIGRAM(S): 100 INJECTION SUBCUTANEOUS at 05:08

## 2022-10-23 NOTE — PROGRESS NOTE ADULT - ASSESSMENT
76M with hx of COPD/emphysema, CAD s/p 2x stents, bradycardia s/p PPM, HTN, DM2 not on insulin who presents with SOB, ARNOLD, and wheezing.  Consistent with COPD exacerbation.      COPD exacerbation  - admitted to remote telemetry  - monitor O2 sat  - nebs PRN  - cont with steroids  - pulmonary consult  - check ESR, CRP, procalcitonin  - continue ceftriaxone  - f/u cultures  pulmonary recommending to keep one more day    Slight SHARI  - worsening SHARI  IVF  reassess in AM  hold nephrotoxic meds    CAD   - cont with plavix, ASA, amlodipine, atorvastatin  - holding valsartan-HCTZ    DM2   - hold metformin  - start low dose insulin coverage scale with FS qAC and qHS  - monitor glucose anderson while on steroids  - diabetic diet    Depression  - cont with paxil    Preventive measures  - lovenox for DVT ppx   76M with hx of COPD/emphysema, CAD s/p 2x stents, bradycardia s/p PPM, HTN, DM2 not on insulin who presents with SOB, ARNOLD, and wheezing.  Consistent with COPD exacerbation.      COPD exacerbation  - admitted to remote telemetry  - monitor O2 sat  - nebs PRN  - cont with steroids  - pulmonary consult  - check ESR, CRP, procalcitonin  - continue ceftriaxone  - f/u cultures  pulmonary recommending to keep one more day    SHARI  - worsening SHARI  IVF  reassess in AM  hold nephrotoxic meds    CAD   - cont with plavix, ASA, amlodipine, atorvastatin  - holding valsartan-HCTZ    DM2   - hold metformin  - start low dose insulin coverage scale with FS qAC and qHS  - monitor glucose anderson while on steroids  - diabetic diet    Depression  - cont with paxil    Preventive measures  - lovenox for DVT ppx

## 2022-10-23 NOTE — PROGRESS NOTE ADULT - ASSESSMENT
76M with hx of COPD/emphysema, CAD s/p 2x stents, bradycardia s/p PPM, HTN, DM2 not on insulin who presents with SOB, ARNOLD, and wheezing.     copd  copd ex eval  emphysema  cad  panfilo  PPM  HTN  DM  Asbestos exp hx    on IVF  VS noted  on RA  feels better    no evidence of PNA  CTA chest -   albuterol - symbicort - spiriva - systemic steroids  VBG -   proBNP noted  biomarkers -   monitor VS and HD and Sat  keep sat > 88 pct  pt follows with Dr Ashlie Jaramillo in [place] -

## 2022-10-24 DIAGNOSIS — E11.9 TYPE 2 DIABETES MELLITUS WITHOUT COMPLICATIONS: ICD-10-CM

## 2022-10-24 LAB
ALBUMIN SERPL ELPH-MCNC: 3 G/DL — LOW (ref 3.3–5)
ALP SERPL-CCNC: 68 U/L — SIGNIFICANT CHANGE UP (ref 30–120)
ALT FLD-CCNC: 13 U/L DA — SIGNIFICANT CHANGE UP (ref 10–60)
ANION GAP SERPL CALC-SCNC: 8 MMOL/L — SIGNIFICANT CHANGE UP (ref 5–17)
AST SERPL-CCNC: 21 U/L — SIGNIFICANT CHANGE UP (ref 10–40)
BILIRUB SERPL-MCNC: 0.3 MG/DL — SIGNIFICANT CHANGE UP (ref 0.2–1.2)
BUN SERPL-MCNC: 34 MG/DL — HIGH (ref 7–23)
CALCIUM SERPL-MCNC: 8.3 MG/DL — LOW (ref 8.4–10.5)
CHLORIDE SERPL-SCNC: 106 MMOL/L — SIGNIFICANT CHANGE UP (ref 96–108)
CO2 SERPL-SCNC: 28 MMOL/L — SIGNIFICANT CHANGE UP (ref 22–31)
CREAT SERPL-MCNC: 1.4 MG/DL — HIGH (ref 0.5–1.3)
CRP SERPL-MCNC: 5 MG/L — HIGH
EGFR: 51 ML/MIN/1.73M2 — LOW
GLUCOSE SERPL-MCNC: 103 MG/DL — HIGH (ref 70–99)
HCT VFR BLD CALC: 34.3 % — LOW (ref 39–50)
HGB BLD-MCNC: 11.3 G/DL — LOW (ref 13–17)
MCHC RBC-ENTMCNC: 29.9 PG — SIGNIFICANT CHANGE UP (ref 27–34)
MCHC RBC-ENTMCNC: 32.9 GM/DL — SIGNIFICANT CHANGE UP (ref 32–36)
MCV RBC AUTO: 90.7 FL — SIGNIFICANT CHANGE UP (ref 80–100)
NRBC # BLD: 0 /100 WBCS — SIGNIFICANT CHANGE UP (ref 0–0)
NT-PROBNP SERPL-SCNC: 1518 PG/ML — HIGH (ref 0–450)
PLATELET # BLD AUTO: 168 K/UL — SIGNIFICANT CHANGE UP (ref 150–400)
POTASSIUM SERPL-MCNC: 3.8 MMOL/L — SIGNIFICANT CHANGE UP (ref 3.5–5.3)
POTASSIUM SERPL-SCNC: 3.8 MMOL/L — SIGNIFICANT CHANGE UP (ref 3.5–5.3)
PROT SERPL-MCNC: 6.2 G/DL — SIGNIFICANT CHANGE UP (ref 6–8.3)
RBC # BLD: 3.78 M/UL — LOW (ref 4.2–5.8)
RBC # FLD: 14.5 % — SIGNIFICANT CHANGE UP (ref 10.3–14.5)
SODIUM SERPL-SCNC: 142 MMOL/L — SIGNIFICANT CHANGE UP (ref 135–145)
WBC # BLD: 11.16 K/UL — HIGH (ref 3.8–10.5)
WBC # FLD AUTO: 11.16 K/UL — HIGH (ref 3.8–10.5)

## 2022-10-24 PROCEDURE — 99233 SBSQ HOSP IP/OBS HIGH 50: CPT

## 2022-10-24 PROCEDURE — 99221 1ST HOSP IP/OBS SF/LOW 40: CPT

## 2022-10-24 RX ADMIN — Medication 20 MILLIGRAM(S): at 13:35

## 2022-10-24 RX ADMIN — AMLODIPINE BESYLATE 5 MILLIGRAM(S): 2.5 TABLET ORAL at 06:11

## 2022-10-24 RX ADMIN — ENOXAPARIN SODIUM 40 MILLIGRAM(S): 100 INJECTION SUBCUTANEOUS at 06:11

## 2022-10-24 RX ADMIN — ALBUTEROL 2.5 MILLIGRAM(S): 90 AEROSOL, METERED ORAL at 22:43

## 2022-10-24 RX ADMIN — CLOPIDOGREL BISULFATE 75 MILLIGRAM(S): 75 TABLET, FILM COATED ORAL at 14:32

## 2022-10-24 RX ADMIN — Medication 1 TABLET(S): at 13:35

## 2022-10-24 RX ADMIN — BUDESONIDE AND FORMOTEROL FUMARATE DIHYDRATE 2 PUFF(S): 160; 4.5 AEROSOL RESPIRATORY (INHALATION) at 18:55

## 2022-10-24 RX ADMIN — Medication 1: at 18:48

## 2022-10-24 RX ADMIN — ALBUTEROL 2.5 MILLIGRAM(S): 90 AEROSOL, METERED ORAL at 07:44

## 2022-10-24 RX ADMIN — Medication 81 MILLIGRAM(S): at 14:34

## 2022-10-24 RX ADMIN — Medication 2 GRAM(S): at 13:36

## 2022-10-24 RX ADMIN — Medication 50 MILLIGRAM(S): at 06:11

## 2022-10-24 RX ADMIN — Medication 500 MILLIGRAM(S): at 13:34

## 2022-10-24 RX ADMIN — Medication 1: at 13:32

## 2022-10-24 RX ADMIN — ALBUTEROL 2.5 MILLIGRAM(S): 90 AEROSOL, METERED ORAL at 15:01

## 2022-10-24 RX ADMIN — BUDESONIDE AND FORMOTEROL FUMARATE DIHYDRATE 2 PUFF(S): 160; 4.5 AEROSOL RESPIRATORY (INHALATION) at 06:11

## 2022-10-24 RX ADMIN — TIOTROPIUM BROMIDE 1 CAPSULE(S): 18 CAPSULE ORAL; RESPIRATORY (INHALATION) at 06:12

## 2022-10-24 NOTE — CONSULT NOTE ADULT - CONSULT REASON
76y A1C with Estimated Average Glucose Result: 5.9 % (10-22-22 @ 07:57)   diabetes mellitus uncontrolled type 2

## 2022-10-24 NOTE — PROGRESS NOTE ADULT - ASSESSMENT
76M with hx of COPD/emphysema, CAD s/p 2x stents, bradycardia s/p PPM, HTN, DM2 not on insulin who presents with SOB, ARNOLD, and wheezing.     copd  copd ex eval  emphysema  cad  panfilo  PPM  HTN  DM  Asbestos exp hx    prob DC home today  follow up with Dr Ashlie Jaramillo in [place]  complete 4 days of PO Prednisone    no evidence of PNA  CTA chest -   albuterol - symbicort - spiriva - systemic steroids  VBG -   proBNP noted  biomarkers -   monitor VS and HD and Sat  keep sat > 88 pct  pt follows with Dr Ashlie Jaramillo in [place] -

## 2022-10-24 NOTE — CHART NOTE - NSCHARTNOTEFT_GEN_A_CORE
"  History     Chief Complaint   Patient presents with     Pharyngitis     \"sore and scratchy throat started this morning\"      Cough     \"started this morning\"      HPI  Navjot Kerr is a 17 year old male who presents to urgent care today for complaints of sore throat and cough which started this morning.  Denies fever, chills, nausea, vomiting, diarrhea, chest pain, shortness of breath, loss of taste/smell.  History of strep.  Eating and drinking normal.  Patient works at [employer] and boss told him he needs to come in to get COVID test.  No other concerns.    Allergies:  No Known Allergies    Problem List:    Patient Active Problem List    Diagnosis Date Noted     Morbid obesity (H) 09/18/2020     Priority: Medium        Past Medical History:    Past Medical History:   Diagnosis Date     Asthma, unspecified type, with (acute) exacerbatio 03/14/2003     Asthma,unspecified type, unspecified 04/12/2002     HTN (hypertension)        Past Surgical History:    Past Surgical History:   Procedure Laterality Date     asthma         Family History:    Family History   Problem Relation Age of Onset     Alcoholism Mother      Heart Disease Paternal Grandmother      Unknown/Adopted Maternal Grandmother      Unknown/Adopted Maternal Grandfather        Social History:  Marital Status:  Single [1]  Social History     Tobacco Use     Smoking status: Current Every Day Smoker     Smokeless tobacco: Never Used   Substance Use Topics     Alcohol use: No     Drug use: No        Medications:         ibuprofen (ADVIL/MOTRIN) 600 MG tablet      Review of Systems   Constitutional: Negative for activity change, appetite change, chills, diaphoresis, fatigue and fever.   HENT: Positive for sore throat. Negative for congestion, ear pain, postnasal drip, sinus pressure, sinus pain, sneezing and trouble swallowing.    Eyes: Negative for pain, discharge, redness and itching.   Respiratory: Negative for cough, chest tightness and shortness of breath. " spoke with daughter Jc this am    Cardiovascular: Negative for chest pain and palpitations.   Gastrointestinal: Negative for abdominal pain, diarrhea, nausea and vomiting.   Musculoskeletal: Negative for arthralgias and myalgias.   Skin: Negative for rash.   Neurological: Negative for dizziness, weakness and headaches.   Psychiatric/Behavioral: Negative.        Physical Exam   BP: 149/73  Pulse: 71  Temp: 97.5  F (36.4  C)  Resp: 18  SpO2: 98 %      Physical Exam  Vitals signs and nursing note reviewed.   Constitutional:       General: He is not in acute distress.     Appearance: He is well-developed. He is not ill-appearing, toxic-appearing or diaphoretic.   HENT:      Head: Normocephalic.      Right Ear: Tympanic membrane and ear canal normal.      Left Ear: Tympanic membrane and ear canal normal.      Nose: No congestion or rhinorrhea.      Mouth/Throat:      Mouth: Mucous membranes are moist.      Pharynx: Oropharynx is clear.   Eyes:      Conjunctiva/sclera: Conjunctivae normal.      Pupils: Pupils are equal, round, and reactive to light.   Neck:      Musculoskeletal: Normal range of motion and neck supple.   Cardiovascular:      Rate and Rhythm: Normal rate and regular rhythm.      Heart sounds: Normal heart sounds.   Pulmonary:      Effort: Pulmonary effort is normal.      Breath sounds: Normal breath sounds.   Abdominal:      General: Bowel sounds are normal.      Palpations: Abdomen is soft.   Skin:     General: Skin is warm.      Capillary Refill: Capillary refill takes less than 2 seconds.   Neurological:      General: No focal deficit present.      Mental Status: He is alert.   Psychiatric:         Mood and Affect: Mood normal.       ED Course     Results for orders placed or performed during the hospital encounter of 10/13/20 (from the past 24 hour(s))   Group A Streptococcus PCR Throat Swab    Specimen: Throat   Result Value Ref Range    Specimen Description Throat     Strep Group A PCR Not Detected NDET^Not Detected        Medications - No data to display    Assessments & Plan (with Medical Decision Making)     I have reviewed the nursing notes.    I have reviewed the findings, diagnosis, plan and need for follow up with the patient.  (J02.9) Sore throat  (primary encounter diagnosis)  Comment: Strep Negative  Plan:   Symptomatic treatments recommended.  -Discussed that antibiotics would not help symptoms of viral URI. Education provided on symptoms of secondary bacterial infection such as new fever, chills, rigors, shortness of breath, increased work of breathing, that can occur with viral URI and need for further evaluation, if they occur.   - Ensure you are staying hydrated by drinking plenty of fluids or eating foods such as popsicles, jello, pudding.  - Honey can be soothing for sore throat  - Warm salt water gurgles can help soothe sore throat  - Rest  - Humidifier can help with congestion and help keep mucus membranes such as throat and nose from drying out.  - Sleeping slightly propped up can help with congestion and postnasal drainage that can worsen cough at bedtime.  - As long as you have never been told to take Tylenol and/or Ibuprofen you can use them to manage fever and body aches per package instructions  Make sure you eat when you take ibuprofen to avoid stomach upset.  - OTC cough medications per package instructions to help with cough. Check to see if the cough/cold medication already has acetaminophen (Tylenol) in it. If it does avoid taking additional Tylenol.  - If sudden onset of new fever, worsening symptoms return for further evaluation.  - OTC nasal steroid such as Flonase can help decrease sinus inflammation to help with congestion.  - Education provided on symptoms of post-viral bacterial infections including ear infection and pneumonia. This would require re-evaluation for treatment.    (R05) Cough  Comment: Patient states work wants him to get a COVID test due to cough today.  Plan: COVID-19 GetWell  Loop Referral  COVID Test takes 48-72 hours to result.  If test is positive you will receive a phone call and if negative you will receive a mychart message (if you have mychart) otherwise will receive a letter in the mail.       Return to urgent care/ED with any worsening in condition or additional concerns.       New Prescriptions    No medications on file       Final diagnoses:   Sore throat   Cough       10/13/2020   [facility]     Janay Hernandez NP  10/13/20 1814

## 2022-10-24 NOTE — PROGRESS NOTE ADULT - ASSESSMENT
76M with hx of COPD/emphysema, CAD s/p 2x stents, bradycardia s/p PPM, HTN, DM2 not on insulin who presents with SOB, ARNOLD, and wheezing.  Consistent with COPD exacerbation.      COPD exacerbation  - admitted to remote telemetry  - monitor O2 sat  - nebs PRN  - cont with steroids  - pulmonary consult  - check ESR, CRP, procalcitonin  - continue ceftriaxone  - f/u cultures  pulmonary recommending to keep one more day    SHARI  - worsening SHARI  IVF  reassess in AM  hold nephrotoxic meds    CAD   - cont with plavix, ASA, amlodipine, atorvastatin  - holding valsartan-HCTZ    DM2   - hold metformin  - start low dose insulin coverage scale with FS qAC and qHS  - monitor glucose anderson while on steroids  - diabetic diet    Depression  - cont with paxil    Preventive measures  - lovenox for DVT ppx

## 2022-10-24 NOTE — CONSULT NOTE ADULT - PROBLEM SELECTOR RECOMMENDATION 9
Type 2 A1c 5.9% adm COPD  FU appt: Yifan Nov 2022  DSC recommendations: return to home regimen and glucose monitoring  diabetes education provided  Diabetes support info and cell # [phone] given   Goal 100-180 mg/dL; 140-180 mg/dL in critical care areas

## 2022-10-24 NOTE — CONSULT NOTE ADULT - ASSESSMENT
Physical Exam:   Vital Signs Last 24 Hrs  T(C): 36.6 (24 Oct 2022 09:22), Max: 36.8 (23 Oct 2022 23:42)  T(F): 97.9 (24 Oct 2022 09:22), Max: 98.2 (23 Oct 2022 23:42)  HR: 64 (24 Oct 2022 15:02) (64 - 84)  BP: 176/75 (24 Oct 2022 09:22) (118/58 - 176/75)  BP(mean): --  RR: 18 (24 Oct 2022 09:22) (18 - 18)  SpO2: 99% (24 Oct 2022 15:02) (94% - 99%)    Parameters below as of 24 Oct 2022 15:02  Patient On (Oxygen Delivery Method): room air             CAPILLARY BLOOD GLUCOSE      POCT Blood Glucose.: 165 mg/dL (24 Oct 2022 12:51)  POCT Blood Glucose.: 137 mg/dL (24 Oct 2022 08:30)  POCT Blood Glucose.: 118 mg/dL (23 Oct 2022 22:09)  POCT Blood Glucose.: 149 mg/dL (23 Oct 2022 17:16)      Cholesterol, Serum: 113 mg/dL (05.19.21 @ 08:36)     HDL Cholesterol, Serum: 22 mg/dL (05.19.21 @ 08:36)     LDL Cholesterol Calculated: 66 mg/dL (05.19.21 @ 08:36)     DIET: NPO fir surgery        
76M with hx of COPD/emphysema, CAD s/p 2x stents, bradycardia s/p PPM, HTN, DM2 not on insulin who presents with SOB, ARNOLD, and wheezing.     copd  copd ex eval  emphysema  cad  panfilo  PPM  HTN  DM  Asbestos exp hx    no evidence of PNA  CTA chest -   albuterol - symbicort - spiriva - systemic steroids  VBG -   proBNP noted  biomarkers -   monitor VS and HD and Sat  keep sat > 88 pct  pt follows with Dr Ashlie Jaramillo in [place] -

## 2022-10-24 NOTE — CONSULT NOTE ADULT - SUBJECTIVE AND OBJECTIVE BOX
Date/Time Patient Seen:  		  Referring MD:   Data Reviewed	       Patient is a 76y old  Male who presents with a chief complaint of SOB, hypoxia, wheezing -> COPD exacerbation (22 Oct 2022 00:45)      Subjective/HPI  in bed  seen and examined  vs noted  labs reviewed  imaging reviewed  er provider note reviewed  H and P reviewed  alert  verbal  oriented  ex smoker  asbestos exp  follows with dr elkisn in [place] for pulmonary       History of Present Illness:   76M with hx of COPD/emphysema, CAD s/p 2x stents, bradycardia s/p PPM, HTN, DM2 not on insulin who presents with SOB, ARNOLD, and wheezing.  Symptoms started today with the patient wheezing with worsening ARNOLD (has chronic ARNOLD).  Patient had a pulse ox that showed as low as 86%.  Patient was able to get some albuterol inhaler which improved his symptoms.  However his breathing worsened after he finished the albuterol.  Decided to come here for further evaluation.  Patient denied any fevers, nasuea/vomiting/diarrhea.  Has chronic phelgm but has not worsened.  No sick contacts.  No recent travel.  No history of COPD exacerbation.  Patient denied getting his flu vaccine but did receive his COVID series plus one booster.  In the ED, patient's BP was 178/82, HR 70  RR 22, 95%, and T 97.4F.  Labs showed only slight SHARI at Cr 1.35 and .  Troponins neg x3.  COVID neg.  Patient started to wheeze again in the ED and therefore patient is being admitted for possible COPD exacerbation.  Currently feels better s/p neb treatment.        PAST MEDICAL & SURGICAL HISTORY:  H/O bundle branch block    Pacemaker    COPD, moderate    Emphysema/COPD    Hypertension    Borderline type 2 diabetes mellitus    CAD (coronary artery disease)  s/p 2 stents    Pacemaker    History of cholecystectomy    History of appendectomy    FAMILY HISTORY:  Father  Still living? Unknown  FH: emphysema, Age at diagnosis: Age Unknown    Mother  Still living? Unknown  FH: lung cancer, Age at diagnosis: Age Unknown.     Social History:  · Substance use	Yes   · Alcohol use	was a former alcoholic, quit in 1983, denied any hospitalizations or withdrawals from etoh  · Substance use	none  · Tobacco use	former smoker, quit in 1983, was a 3-4 ppd for 5 years  · Social History (marital status, living situation, occupation, and sexual history)	lives alone  walks unassisted     Tobacco Screening:  · Core Measure Site	No  · Has the patient used tobacco in the past 30 days?	No     Risk Assessment:    Present on Admission:  Deep Venous Thrombosis	no   Pulmonary Embolus	no      HIV Screening:  · In accordance with [place] State law, we offer every patient who comes to our ED an HIV test. Would you like to be tested today?	Opt out         Medication list         MEDICATIONS  (STANDING):  albuterol/ipratropium for Nebulization 3 milliLiter(s) Nebulizer every 6 hours  amLODIPine   Tablet 5 milliGRAM(s) Oral daily  ascorbic acid 500 milliGRAM(s) Oral daily  aspirin enteric coated 81 milliGRAM(s) Oral daily  cefTRIAXone   IVPB      clopidogrel Tablet 75 milliGRAM(s) Oral daily  dextrose 5%. 1000 milliLiter(s) (100 mL/Hr) IV Continuous <Continuous>  dextrose 5%. 1000 milliLiter(s) (50 mL/Hr) IV Continuous <Continuous>  dextrose 50% Injectable 25 Gram(s) IV Push once  dextrose 50% Injectable 12.5 Gram(s) IV Push once  dextrose 50% Injectable 25 Gram(s) IV Push once  enoxaparin Injectable 40 milliGRAM(s) SubCutaneous every 24 hours  glucagon  Injectable 1 milliGRAM(s) IntraMuscular once  insulin lispro (ADMELOG) corrective regimen sliding scale   SubCutaneous three times a day before meals  insulin lispro (ADMELOG) corrective regimen sliding scale   SubCutaneous at bedtime  lactated ringers. 1000 milliLiter(s) (75 mL/Hr) IV Continuous <Continuous>  methylPREDNISolone sodium succinate Injectable 40 milliGRAM(s) IV Push every 8 hours  multivitamin 1 Tablet(s) Oral daily  omega-3-Acid Ethyl Esters 2 Gram(s) Oral daily  PARoxetine 20 milliGRAM(s) Oral daily    MEDICATIONS  (PRN):  acetaminophen     Tablet .. 650 milliGRAM(s) Oral every 6 hours PRN Temp greater or equal to 38C (100.4F), Mild Pain (1 - 3)  aluminum hydroxide/magnesium hydroxide/simethicone Suspension 30 milliLiter(s) Oral every 4 hours PRN Dyspepsia  dextrose Oral Gel 15 Gram(s) Oral once PRN Blood Glucose LESS THAN 70 milliGRAM(s)/deciliter  melatonin 3 milliGRAM(s) Oral at bedtime PRN Insomnia  ondansetron Injectable 4 milliGRAM(s) IV Push every 8 hours PRN Nausea and/or Vomiting         Vitals log        ICU Vital Signs Last 24 Hrs  T(C): 36.5 (22 Oct 2022 06:18), Max: 36.5 (22 Oct 2022 01:17)  T(F): 97.7 (22 Oct 2022 06:18), Max: 97.7 (22 Oct 2022 01:17)  HR: 66 (22 Oct 2022 06:18) (60 - 95)  BP: 123/60 (22 Oct 2022 06:18) (123/60 - 178/82)  BP(mean): --  ABP: --  ABP(mean): --  RR: 20 (22 Oct 2022 06:18) (20 - 26)  SpO2: 93% (22 Oct 2022 06:18) (93% - 98%)    O2 Parameters below as of 22 Oct 2022 06:18  Patient On (Oxygen Delivery Method): room air                 Input and Output:  I&O's Detail      Lab Data                        13.3   8.40  )-----------( 194      ( 21 Oct 2022 22:07 )             40.3     10-21    142  |  103  |  21  ----------------------------<  110<H>  3.8   |  29  |  1.35<H>    Ca    9.1      21 Oct 2022 22:07    TPro  7.8  /  Alb  3.9  /  TBili  0.8  /  DBili  x   /  AST  12  /  ALT  12  /  AlkPhos  97  10-21            Review of Systems	    sob  arnold  wheeze    Objective     Physical Examination  heart s1s2  lung dec BS  head nc  verbal  alert  cn grossly int        Pertinent Lab findings & Imaging      Debra:  NO   Adequate UO     I&O's Detail           Discussed with:     Cultures:	        Radiology                            
Patient is a 76y old  Male who presents with a chief complaint of SOB, hypoxia, wheezing -> COPD exacerbation (24 Oct 2022 13:43)    Type:2 DX 10 years. a1c 5.9%. No known complications. PCP Yifan manages diabetes. Rx metformin. Ha sglucometer tests daily- unable to recall #'s denies BG > 200 mg/dL.    diabetes education provided verbally and handouts.   Hx + ASCVD, No CKD, No HF    HPI:  76M with hx of COPD/emphysema, CAD s/p 2x stents, bradycardia s/p PPM, HTN, DM2 not on insulin who presents with SOB, ARNOLD, and wheezing.  Symptoms started today with the patient wheezing with worsening ARNOLD (has chronic ARNOLD).  Patient had a pulse ox that showed as low as 86%.  Patient was able to get some albuterol inhaler which improved his symptoms.  However his breathing worsened after he finished the albuterol.  Decided to come here for further evaluation.  Patient denied any fevers, nasuea/vomiting/diarrhea.  Has chronic phelgm but has not worsened.  No sick contacts.  No recent travel.  No history of COPD exacerbation.  Patient denied getting his flu vaccine but did receive his COVID series plus one booster.  In the ED, patient's BP was 178/82, HR 70  RR 22, 95%, and T 97.4F.  Labs showed only slight SHARI at Cr 1.35 and .  Troponins neg x3.  COVID neg.  Patient started to wheeze again in the ED and therefore patient is being admitted for possible COPD exacerbation.  Currently feels better s/p neb treatment.       (22 Oct 2022 00:45)      PAST MEDICAL & SURGICAL HISTORY:  H/O bundle branch block      Pacemaker      COPD, moderate      Emphysema/COPD      Hypertension      Borderline type 2 diabetes mellitus      CAD (coronary artery disease)  s/p 2 stents      Pacemaker      History of cholecystectomy      History of appendectomy          Allergies    No Known Allergies    Intolerances        MEDICATIONS  (STANDING):  ALBUTerol    0.083% 2.5 milliGRAM(s) Nebulizer every 8 hours  amLODIPine   Tablet 5 milliGRAM(s) Oral daily  ascorbic acid 500 milliGRAM(s) Oral daily  aspirin enteric coated 81 milliGRAM(s) Oral daily  budesonide  80 MICROgram(s)/formoterol 4.5 MICROgram(s) Inhaler 2 Puff(s) Inhalation two times a day  clopidogrel Tablet 75 milliGRAM(s) Oral daily  dextrose 5%. 1000 milliLiter(s) (50 mL/Hr) IV Continuous <Continuous>  dextrose 5%. 1000 milliLiter(s) (100 mL/Hr) IV Continuous <Continuous>  dextrose 50% Injectable 25 Gram(s) IV Push once  dextrose 50% Injectable 12.5 Gram(s) IV Push once  dextrose 50% Injectable 25 Gram(s) IV Push once  enoxaparin Injectable 40 milliGRAM(s) SubCutaneous every 24 hours  glucagon  Injectable 1 milliGRAM(s) IntraMuscular once  insulin lispro (ADMELOG) corrective regimen sliding scale   SubCutaneous three times a day before meals  insulin lispro (ADMELOG) corrective regimen sliding scale   SubCutaneous at bedtime  lactated ringers. 1000 milliLiter(s) (75 mL/Hr) IV Continuous <Continuous>  multivitamin 1 Tablet(s) Oral daily  omega-3-Acid Ethyl Esters 2 Gram(s) Oral daily  PARoxetine 20 milliGRAM(s) Oral daily  predniSONE   Tablet 50 milliGRAM(s) Oral daily  tiotropium 18 MICROgram(s) Capsule 1 Capsule(s) Inhalation daily

## 2022-10-25 ENCOUNTER — TRANSCRIPTION ENCOUNTER (OUTPATIENT)
Age: 77
End: 2022-10-25

## 2022-10-25 LAB
ANION GAP SERPL CALC-SCNC: 5 MMOL/L — SIGNIFICANT CHANGE UP (ref 5–17)
BUN SERPL-MCNC: 30 MG/DL — HIGH (ref 7–23)
CALCIUM SERPL-MCNC: 9.1 MG/DL — SIGNIFICANT CHANGE UP (ref 8.4–10.5)
CHLORIDE SERPL-SCNC: 100 MMOL/L — SIGNIFICANT CHANGE UP (ref 96–108)
CO2 SERPL-SCNC: 32 MMOL/L — HIGH (ref 22–31)
CREAT SERPL-MCNC: 1.27 MG/DL — SIGNIFICANT CHANGE UP (ref 0.5–1.3)
EGFR: 58 ML/MIN/1.73M2 — LOW
GLUCOSE SERPL-MCNC: 166 MG/DL — HIGH (ref 70–99)
NT-PROBNP SERPL-SCNC: 2390 PG/ML — HIGH (ref 0–450)
POTASSIUM SERPL-MCNC: 5.3 MMOL/L — SIGNIFICANT CHANGE UP (ref 3.5–5.3)
POTASSIUM SERPL-SCNC: 5.3 MMOL/L — SIGNIFICANT CHANGE UP (ref 3.5–5.3)
SODIUM SERPL-SCNC: 137 MMOL/L — SIGNIFICANT CHANGE UP (ref 135–145)

## 2022-10-25 PROCEDURE — 99239 HOSP IP/OBS DSCHRG MGMT >30: CPT

## 2022-10-25 RX ADMIN — Medication 20 MILLIGRAM(S): at 12:52

## 2022-10-25 RX ADMIN — Medication 2 GRAM(S): at 12:53

## 2022-10-25 RX ADMIN — Medication 1 TABLET(S): at 12:52

## 2022-10-25 RX ADMIN — AMLODIPINE BESYLATE 5 MILLIGRAM(S): 2.5 TABLET ORAL at 06:08

## 2022-10-25 RX ADMIN — BUDESONIDE AND FORMOTEROL FUMARATE DIHYDRATE 2 PUFF(S): 160; 4.5 AEROSOL RESPIRATORY (INHALATION) at 19:08

## 2022-10-25 RX ADMIN — ALBUTEROL 2.5 MILLIGRAM(S): 90 AEROSOL, METERED ORAL at 22:30

## 2022-10-25 RX ADMIN — ALBUTEROL 2.5 MILLIGRAM(S): 90 AEROSOL, METERED ORAL at 07:12

## 2022-10-25 RX ADMIN — ALBUTEROL 2.5 MILLIGRAM(S): 90 AEROSOL, METERED ORAL at 14:49

## 2022-10-25 RX ADMIN — Medication 50 MILLIGRAM(S): at 06:07

## 2022-10-25 RX ADMIN — CLOPIDOGREL BISULFATE 75 MILLIGRAM(S): 75 TABLET, FILM COATED ORAL at 12:52

## 2022-10-25 RX ADMIN — Medication 81 MILLIGRAM(S): at 12:52

## 2022-10-25 RX ADMIN — ENOXAPARIN SODIUM 40 MILLIGRAM(S): 100 INJECTION SUBCUTANEOUS at 06:07

## 2022-10-25 RX ADMIN — Medication 500 MILLIGRAM(S): at 12:52

## 2022-10-25 RX ADMIN — TIOTROPIUM BROMIDE 1 CAPSULE(S): 18 CAPSULE ORAL; RESPIRATORY (INHALATION) at 12:53

## 2022-10-25 NOTE — PROGRESS NOTE ADULT - ASSESSMENT
76M with hx of COPD/emphysema, CAD s/p 2x stents, bradycardia s/p PPM, HTN, DM2 not on insulin who presents with SOB, ARNOLD, and wheezing.  Consistent with COPD exacerbation.      COPD exacerbation  - admitted to remote telemetry  - nebs PRN  - completed steroids and abx  - f/u cultures  Pulmonary cleared pt to go home.  completed steroids  satting well on room air and while ambulating  daughter still wants home o2 and willing to private pay        SHARI  improved    CAD   - cont with plavix, ASA, amlodipine, atorvastatin  - continue valsartan-HCTZ when discharged    DM2   - hold metformin  - start low dose insulin coverage scale with FS qAC and qHS  - monitor glucose anderson while on steroids  - diabetic diet    Depression  - cont with paxil    Preventive measures  - lovenox for DVT ppx

## 2022-10-25 NOTE — DISCHARGE NOTE PROVIDER - NSDCMRMEDTOKEN_GEN_ALL_CORE_FT
albuterol 90 mcg/inh inhalation aerosol: 2 puff(s) inhaled every 6 hours, As Needed - for shortness of breath and/or wheezing  amLODIPine 5 mg oral tablet: 1 tab(s) orally once a day  ascorbic acid 500 mg oral tablet: 1 tab(s) orally once a day  aspirin 81 mg oral tablet: 1 tab(s) orally once a day  Breztri Aerosphere inhalation aerosol: 2 puff(s) inhaled 2 times a day  clopidogrel 75 mg oral tablet: 1 tab(s) orally once a day  fluticasone propionate 55 mcg/inh inhalation powder: 1 puff(s) inhaled once a day  metFORMIN 1000 mg oral tablet: 1 tab(s) orally 2 times a day  Multiple Vitamins oral tablet: 1 tab(s) orally once a day  omega-3 polyunsaturated fatty acids 500 mg oral capsule: 1 cap(s) orally once a day  PARoxetine 20 mg oral tablet: 1 tab(s) orally once a day  ramelteon 8 mg oral tablet: 1 tab(s) orally once a day (at bedtime), As Needed - for insomnia  valsartan-hydrochlorothiazide 80 mg-12.5 mg oral tablet: 1 tab(s) orally once a day

## 2022-10-25 NOTE — DISCHARGE NOTE PROVIDER - CARE PROVIDER_API CALL
Carlos Dailey  [address] [place] [address]  Phone: [phone]  Fax: (   )    -  Follow Up Time:

## 2022-10-25 NOTE — DISCHARGE NOTE PROVIDER - PROVIDER TOKENS
FREE:[LAST:[Ashlie],FIRST:[Carlos],PHONE:[phone]:[(   [phone]    -],ADDRESS:[[address] [place] [address]]]

## 2022-10-25 NOTE — DISCHARGE NOTE PROVIDER - NSDCCPCAREPLAN_GEN_ALL_CORE_FT
PRINCIPAL DISCHARGE DIAGNOSIS  Diagnosis: Dyspnea on exertion  Assessment and Plan of Treatment: COPD exacerbation, improved with supplemental O2 and steroids.  Cleared by pulmonary to go home.  Follow up with Dr. Dailey pulmonary doctor as outpatient.

## 2022-10-25 NOTE — DISCHARGE NOTE PROVIDER - ATTENDING DISCHARGE PHYSICAL EXAMINATION:
Vital Signs Last 24 Hrs  T(C): 36.9 (25 Oct 2022 05:59), Max: 36.9 (25 Oct 2022 05:59)  T(F): 98.4 (25 Oct 2022 05:59), Max: 98.4 (25 Oct 2022 05:59)  HR: 60 (25 Oct 2022 07:13) (60 - 66)  BP: 170/75 (25 Oct 2022 05:59) (149/61 - 170/75)  BP(mean): --  RR: 18 (25 Oct 2022 05:59) (18 - 18)  SpO2: 94% (25 Oct 2022 07:13) (94% - 99%)    Parameters below as of 25 Oct 2022 07:13  Patient On (Oxygen Delivery Method): room air

## 2022-10-25 NOTE — DISCHARGE NOTE PROVIDER - HOSPITAL COURSE
HPI:  76M with hx of COPD/emphysema, CAD s/p 2x stents, bradycardia s/p PPM, HTN, DM2 not on insulin who presents with SOB, ARNOLD, and wheezing.  Symptoms started today with the patient wheezing with worsening ARNOLD (has chronic ARNOLD).  Patient had a pulse ox that showed as low as 86%.  Patient was able to get some albuterol inhaler which improved his symptoms.  However his breathing worsened after he finished the albuterol.  Decided to come here for further evaluation.  Patient denied any fevers, nasuea/vomiting/diarrhea.  Has chronic phelgm but has not worsened.  No sick contacts.  No recent travel.  No history of COPD exacerbation.  Patient denied getting his flu vaccine but did receive his COVID series plus one booster.  In the ED, patient's BP was 178/82, HR 70  RR 22, 95%, and T 97.4F.  Labs showed only slight SHARI at Cr 1.35 and .  Troponins neg x3.  COVID neg.  Patient started to wheeze again in the ED and therefore patient is being admitted for possible COPD exacerbation.  Currently feels better s/p neb treatment.       (22 Oct 2022 00:45)      76M with hx of COPD/emphysema, CAD s/p 2x stents, bradycardia s/p PPM, HTN, DM2 not on insulin who presents with SOB, ARNOLD, and wheezing.  Consistent with COPD exacerbation.      COPD exacerbation  - admitted to remote telemetry  - monitor O2 sat  - nebs PRN  - cont with steroids  - pulmonary consult  - check ESR, CRP, procalcitonin  - continue ceftriaxone  - f/u cultures  pulmonary dc'ed prednisone, cleared to go home, follow up with Dr. Dailey in [place].    pt saturating 91% while ambulating, daughter still wants Home O2 and willing to pay for it. Note and script written for O2.     SHARI  Improved after IVF    CAD   - cont with plavix, ASA, amlodipine, atorvastatin  - continue  valsartan-HCTZ at home, f/u with PMD to monitor renal function    DM2   continue metformin at home    Depression  - cont with paxil

## 2022-10-25 NOTE — PROGRESS NOTE ADULT - ASSESSMENT
76M with hx of COPD/emphysema, CAD s/p 2x stents, bradycardia s/p PPM, HTN, DM2 not on insulin who presents with SOB, ARNOLD, and wheezing.     copd  copd ex eval  emphysema  cad  panfilo  PPM  HTN  DM  Asbestos exp hx      follow up with Dr Ashlie Jaramillo in [place]  will dc Prednisone this am   caution with IVF - monitor for vol overload  am Cr pending  spoke with Dtr     no evidence of PNA  CTA chest -   albuterol - symbicort - spiriva - systemic steroids  VBG -   proBNP noted  biomarkers -   monitor VS and HD and Sat  keep sat > 88 pct  pt follows with Dr Ashlie Jaramillo in [place] -

## 2022-10-26 ENCOUNTER — TRANSCRIPTION ENCOUNTER (OUTPATIENT)
Age: 77
End: 2022-10-26

## 2022-10-26 VITALS
OXYGEN SATURATION: 94 % | HEART RATE: 62 BPM | TEMPERATURE: 98 F | DIASTOLIC BLOOD PRESSURE: 76 MMHG | SYSTOLIC BLOOD PRESSURE: 158 MMHG | RESPIRATION RATE: 18 BRPM

## 2022-10-26 PROCEDURE — 87635 SARS-COV-2 COVID-19 AMP PRB: CPT

## 2022-10-26 PROCEDURE — 85025 COMPLETE CBC W/AUTO DIFF WBC: CPT

## 2022-10-26 PROCEDURE — 83735 ASSAY OF MAGNESIUM: CPT

## 2022-10-26 PROCEDURE — 94760 N-INVAS EAR/PLS OXIMETRY 1: CPT

## 2022-10-26 PROCEDURE — 83036 HEMOGLOBIN GLYCOSYLATED A1C: CPT

## 2022-10-26 PROCEDURE — 82962 GLUCOSE BLOOD TEST: CPT

## 2022-10-26 PROCEDURE — 84484 ASSAY OF TROPONIN QUANT: CPT

## 2022-10-26 PROCEDURE — 86140 C-REACTIVE PROTEIN: CPT

## 2022-10-26 PROCEDURE — 80053 COMPREHEN METABOLIC PANEL: CPT

## 2022-10-26 PROCEDURE — 93005 ELECTROCARDIOGRAM TRACING: CPT

## 2022-10-26 PROCEDURE — 84100 ASSAY OF PHOSPHORUS: CPT

## 2022-10-26 PROCEDURE — 85027 COMPLETE CBC AUTOMATED: CPT

## 2022-10-26 PROCEDURE — 99285 EMERGENCY DEPT VISIT HI MDM: CPT | Mod: 25

## 2022-10-26 PROCEDURE — 85730 THROMBOPLASTIN TIME PARTIAL: CPT

## 2022-10-26 PROCEDURE — 96374 THER/PROPH/DIAG INJ IV PUSH: CPT

## 2022-10-26 PROCEDURE — 83690 ASSAY OF LIPASE: CPT

## 2022-10-26 PROCEDURE — 0225U NFCT DS DNA&RNA 21 SARSCOV2: CPT

## 2022-10-26 PROCEDURE — 80048 BASIC METABOLIC PNL TOTAL CA: CPT

## 2022-10-26 PROCEDURE — 71045 X-RAY EXAM CHEST 1 VIEW: CPT

## 2022-10-26 PROCEDURE — 83880 ASSAY OF NATRIURETIC PEPTIDE: CPT

## 2022-10-26 PROCEDURE — 99238 HOSP IP/OBS DSCHRG MGMT 30/<: CPT

## 2022-10-26 PROCEDURE — 71275 CT ANGIOGRAPHY CHEST: CPT

## 2022-10-26 PROCEDURE — 94640 AIRWAY INHALATION TREATMENT: CPT

## 2022-10-26 PROCEDURE — 84145 PROCALCITONIN (PCT): CPT

## 2022-10-26 PROCEDURE — 96375 TX/PRO/DX INJ NEW DRUG ADDON: CPT

## 2022-10-26 PROCEDURE — 36415 COLL VENOUS BLD VENIPUNCTURE: CPT

## 2022-10-26 PROCEDURE — 85652 RBC SED RATE AUTOMATED: CPT

## 2022-10-26 PROCEDURE — 85610 PROTHROMBIN TIME: CPT

## 2022-10-26 RX ADMIN — ENOXAPARIN SODIUM 40 MILLIGRAM(S): 100 INJECTION SUBCUTANEOUS at 05:30

## 2022-10-26 RX ADMIN — Medication 20 MILLIGRAM(S): at 12:51

## 2022-10-26 RX ADMIN — Medication 81 MILLIGRAM(S): at 12:50

## 2022-10-26 RX ADMIN — ALBUTEROL 2.5 MILLIGRAM(S): 90 AEROSOL, METERED ORAL at 07:40

## 2022-10-26 RX ADMIN — AMLODIPINE BESYLATE 5 MILLIGRAM(S): 2.5 TABLET ORAL at 05:30

## 2022-10-26 RX ADMIN — ALBUTEROL 2.5 MILLIGRAM(S): 90 AEROSOL, METERED ORAL at 15:41

## 2022-10-26 RX ADMIN — Medication 500 MILLIGRAM(S): at 12:51

## 2022-10-26 RX ADMIN — Medication 2 GRAM(S): at 12:51

## 2022-10-26 RX ADMIN — CLOPIDOGREL BISULFATE 75 MILLIGRAM(S): 75 TABLET, FILM COATED ORAL at 12:51

## 2022-10-26 RX ADMIN — Medication 1 TABLET(S): at 12:51

## 2022-10-26 RX ADMIN — BUDESONIDE AND FORMOTEROL FUMARATE DIHYDRATE 2 PUFF(S): 160; 4.5 AEROSOL RESPIRATORY (INHALATION) at 05:31

## 2022-10-26 NOTE — DISCHARGE NOTE NURSING/CASE MANAGEMENT/SOCIAL WORK - NSSCNAMETXT_GEN_ALL_CORE
[facility] [phone] or  [phone]. Home care agency will reach out to you within 24-72 hours of your discharge to schedule home care visit/eval appointment with you. Please call agency for any queries regarding home care services

## 2022-10-26 NOTE — DIETITIAN INITIAL EVALUATION ADULT - REASON FOR ADMISSION
per H&P "76M with hx of COPD/emphysema, CAD s/p 2x stents, bradycardia s/p PPM, HTN, DM2 not on insulin who presents with SOB, ARNOLD, and wheezing.  Consistent with COPD exacerbation. "

## 2022-10-26 NOTE — DIETITIAN INITIAL EVALUATION ADULT - PERTINENT LABORATORY DATA
10-25    137  |  100  |  30<H>  ----------------------------<  166<H>  5.3   |  32<H>  |  1.27    Ca    9.1      25 Oct 2022 12:10    POCT Blood Glucose.: 119 mg/dL (10-26-22 @ 12:07)  A1C with Estimated Average Glucose Result: 5.9 % (10-22-22 @ 07:57)

## 2022-10-26 NOTE — DISCHARGE NOTE NURSING/CASE MANAGEMENT/SOCIAL WORK - NSDCPEFALRISK_GEN_ALL_CORE
For information on Fall & Injury Prevention, visit: [email or web address] OR  [email or web address] OR  https://www.cdc.gov/steadi/patient.html

## 2022-10-26 NOTE — PROGRESS NOTE ADULT - ASSESSMENT
76M with hx of COPD/emphysema, CAD s/p 2x stents, bradycardia s/p PPM, HTN, DM2 not on insulin who presents with SOB, ARNOLD, and wheezing.  Consistent with COPD exacerbation.      COPD exacerbation  - completed steroids and abx  - nebs PRN  - f/u cultures  - cleared for discharge as per Pulmonary   - satting well on room air and while ambulating  - daughter still wants home o2 and willing to private pay    SHARI  improved, followup as outpatient    CAD   - cont with plavix, ASA, amlodipine, atorvastatin  - continue valsartan-HCTZ when discharged    DM2   - hold metformin  - start low dose insulin coverage scale with FS qAC and qHS  - monitor glucose anderson while on steroids  - diabetic diet    Depression  - cont with paxil    Preventive measures  - lovenox for DVT ppx

## 2022-10-26 NOTE — DISCHARGE NOTE NURSING/CASE MANAGEMENT/SOCIAL WORK - NSDCFUADDAPPT_GEN_ALL_CORE_FT
You are opting to set-up your own appointment within 7-10 days with RAYMUNDO Dailey, Carlos @  [address] [place] [address], [place] [address] [phone].

## 2022-10-26 NOTE — PROGRESS NOTE ADULT - SUBJECTIVE AND OBJECTIVE BOX
Date/Time Patient Seen:  		  Referring MD:   Data Reviewed	       Patient is a 76y old  Male who presents with a chief complaint of SOB, hypoxia, wheezing -> COPD exacerbation (23 Oct 2022 14:10)      Subjective/HPI     PAST MEDICAL & SURGICAL HISTORY:  H/O bundle branch block    Pacemaker    COPD, moderate    Emphysema/COPD    Hypertension    Borderline type 2 diabetes mellitus    CAD (coronary artery disease)  s/p 2 stents    Pacemaker    History of cholecystectomy    History of appendectomy          Medication list         MEDICATIONS  (STANDING):  ALBUTerol    0.083% 2.5 milliGRAM(s) Nebulizer every 8 hours  amLODIPine   Tablet 5 milliGRAM(s) Oral daily  ascorbic acid 500 milliGRAM(s) Oral daily  aspirin enteric coated 81 milliGRAM(s) Oral daily  budesonide  80 MICROgram(s)/formoterol 4.5 MICROgram(s) Inhaler 2 Puff(s) Inhalation two times a day  clopidogrel Tablet 75 milliGRAM(s) Oral daily  dextrose 5%. 1000 milliLiter(s) (100 mL/Hr) IV Continuous <Continuous>  dextrose 5%. 1000 milliLiter(s) (50 mL/Hr) IV Continuous <Continuous>  dextrose 50% Injectable 25 Gram(s) IV Push once  dextrose 50% Injectable 12.5 Gram(s) IV Push once  dextrose 50% Injectable 25 Gram(s) IV Push once  enoxaparin Injectable 40 milliGRAM(s) SubCutaneous every 24 hours  glucagon  Injectable 1 milliGRAM(s) IntraMuscular once  insulin lispro (ADMELOG) corrective regimen sliding scale   SubCutaneous three times a day before meals  insulin lispro (ADMELOG) corrective regimen sliding scale   SubCutaneous at bedtime  lactated ringers. 1000 milliLiter(s) (75 mL/Hr) IV Continuous <Continuous>  multivitamin 1 Tablet(s) Oral daily  omega-3-Acid Ethyl Esters 2 Gram(s) Oral daily  PARoxetine 20 milliGRAM(s) Oral daily  predniSONE   Tablet 50 milliGRAM(s) Oral daily  tiotropium 18 MICROgram(s) Capsule 1 Capsule(s) Inhalation daily    MEDICATIONS  (PRN):  acetaminophen     Tablet .. 650 milliGRAM(s) Oral every 6 hours PRN Temp greater or equal to 38C (100.4F), Mild Pain (1 - 3)  aluminum hydroxide/magnesium hydroxide/simethicone Suspension 30 milliLiter(s) Oral every 4 hours PRN Dyspepsia  dextrose Oral Gel 15 Gram(s) Oral once PRN Blood Glucose LESS THAN 70 milliGRAM(s)/deciliter  melatonin 3 milliGRAM(s) Oral at bedtime PRN Insomnia  ondansetron Injectable 4 milliGRAM(s) IV Push every 8 hours PRN Nausea and/or Vomiting         Vitals log        ICU Vital Signs Last 24 Hrs  T(C): 36.6 (24 Oct 2022 05:56), Max: 36.9 (23 Oct 2022 14:00)  T(F): 97.8 (24 Oct 2022 05:56), Max: 98.4 (23 Oct 2022 14:00)  HR: 71 (24 Oct 2022 05:56) (71 - 85)  BP: 118/58 (24 Oct 2022 05:56) (118/58 - 162/63)  BP(mean): --  ABP: --  ABP(mean): --  RR: 18 (24 Oct 2022 05:56) (18 - 18)  SpO2: 94% (24 Oct 2022 05:56) (93% - 95%)    O2 Parameters below as of 23 Oct 2022 23:14  Patient On (Oxygen Delivery Method): room air                 Input and Output:  I&O's Detail      Lab Data                        11.3   11.16 )-----------( 168      ( 24 Oct 2022 05:45 )             34.3     10-24    142  |  106  |  x   ----------------------------<  x   3.8   |  x   |  x     Ca    8.8      23 Oct 2022 12:01  Phos  2.0     10-22  Mg     1.6     10-22    TPro  6.9  /  Alb  3.4  /  TBili  0.3  /  DBili  x   /  AST  17  /  ALT  12  /  AlkPhos  81  10-23            Review of Systems	      Objective     Physical Examination    heart s1s2  lung dc BS  head nc      Pertinent Lab findings & Imaging      Debra:  NO   Adequate UO     I&O's Detail           Discussed with:     Cultures:	        Radiology                            
Patient is a 76y old  Male who presents with a chief complaint of SOB, hypoxia, wheezing -> COPD exacerbation (23 Oct 2022 06:50)      INTERVAL HPI/OVERNIGHT EVENTS:    no overnight events  feeling better, has an SHARI though    MEDICATIONS  (STANDING):  ALBUTerol    0.083% 2.5 milliGRAM(s) Nebulizer every 8 hours  amLODIPine   Tablet 5 milliGRAM(s) Oral daily  ascorbic acid 500 milliGRAM(s) Oral daily  aspirin enteric coated 81 milliGRAM(s) Oral daily  budesonide  80 MICROgram(s)/formoterol 4.5 MICROgram(s) Inhaler 2 Puff(s) Inhalation two times a day  clopidogrel Tablet 75 milliGRAM(s) Oral daily  dextrose 5%. 1000 milliLiter(s) (100 mL/Hr) IV Continuous <Continuous>  dextrose 5%. 1000 milliLiter(s) (50 mL/Hr) IV Continuous <Continuous>  dextrose 50% Injectable 25 Gram(s) IV Push once  dextrose 50% Injectable 12.5 Gram(s) IV Push once  dextrose 50% Injectable 25 Gram(s) IV Push once  enoxaparin Injectable 40 milliGRAM(s) SubCutaneous every 24 hours  glucagon  Injectable 1 milliGRAM(s) IntraMuscular once  insulin lispro (ADMELOG) corrective regimen sliding scale   SubCutaneous three times a day before meals  insulin lispro (ADMELOG) corrective regimen sliding scale   SubCutaneous at bedtime  lactated ringers. 1000 milliLiter(s) (75 mL/Hr) IV Continuous <Continuous>  multivitamin 1 Tablet(s) Oral daily  omega-3-Acid Ethyl Esters 2 Gram(s) Oral daily  PARoxetine 20 milliGRAM(s) Oral daily  predniSONE   Tablet 50 milliGRAM(s) Oral daily  tiotropium 18 MICROgram(s) Capsule 1 Capsule(s) Inhalation daily    MEDICATIONS  (PRN):  acetaminophen     Tablet .. 650 milliGRAM(s) Oral every 6 hours PRN Temp greater or equal to 38C (100.4F), Mild Pain (1 - 3)  aluminum hydroxide/magnesium hydroxide/simethicone Suspension 30 milliLiter(s) Oral every 4 hours PRN Dyspepsia  dextrose Oral Gel 15 Gram(s) Oral once PRN Blood Glucose LESS THAN 70 milliGRAM(s)/deciliter  melatonin 3 milliGRAM(s) Oral at bedtime PRN Insomnia  ondansetron Injectable 4 milliGRAM(s) IV Push every 8 hours PRN Nausea and/or Vomiting      Allergies    No Known Allergies    Intolerances        REVIEW OF SYSTEMS:  as above    Vital Signs Last 24 Hrs  T(C): 36.6 (23 Oct 2022 09:41), Max: 36.7 (22 Oct 2022 15:44)  T(F): 97.9 (23 Oct 2022 09:41), Max: 98 (22 Oct 2022 15:44)  HR: 77 (23 Oct 2022 09:41) (61 - 88)  BP: 135/51 (23 Oct 2022 09:41) (134/64 - 187/87)  BP(mean): --  RR: 18 (23 Oct 2022 09:41) (18 - 24)  SpO2: 93% (23 Oct 2022 09:41) (93% - 99%)    Parameters below as of 23 Oct 2022 09:41  Patient On (Oxygen Delivery Method): room air        PHYSICAL EXAM:  GENERAL: NAD, well-groomed, well-developed  HEAD:  Atraumatic, Normocephalic  EYES: EOMI, PERRLA, conjunctiva and sclera clear  ENMT: Moist mucous membranes, No lesions; No tonsillar erythema, exudates, or enlargement  NECK: Supple, No JVD, Normal thyroid  NERVOUS SYSTEM:  Alert & Oriented X3, Good concentration; All 4 extremities mobile, no gross sensory deficits.   CHEST/LUNG: Clear to auscultation bilaterally; No rales, rhonchi, wheezing, or rubs  HEART: Regular rate and rhythm; No murmurs, rubs, or gallops  ABDOMEN: Soft, Nontender, Nondistended; Bowel sounds present  EXTREMITIES:  2+ Peripheral Pulses, No clubbing, cyanosis, or edema  LYMPH: No lymphadenopathy noted  SKIN: No rashes or lesions    LABS:                        12.0   12.40 )-----------( 182      ( 23 Oct 2022 12:01 )             36.3     23 Oct 2022 12:01    140    |  102    |  32     ----------------------------<  206    3.7     |  26     |  1.60     Ca    8.8        23 Oct 2022 12:01    TPro  6.9    /  Alb  3.4    /  TBili  0.3    /  DBili  x      /  AST  17     /  ALT  12     /  AlkPhos  81     23 Oct 2022 12:01    PT/INR - ( 21 Oct 2022 22:07 )   PT: 12.7 sec;   INR: 1.10 ratio         PTT - ( 21 Oct 2022 22:07 )  PTT:29.3 sec    CAPILLARY BLOOD GLUCOSE      POCT Blood Glucose.: 215 mg/dL (23 Oct 2022 12:37)  POCT Blood Glucose.: 142 mg/dL (23 Oct 2022 07:55)  POCT Blood Glucose.: 167 mg/dL (22 Oct 2022 21:54)  POCT Blood Glucose.: 143 mg/dL (22 Oct 2022 17:07)      RADIOLOGY & ADDITIONAL TESTS:    
Patient is a 76y old  Male who presents with a chief complaint of SOB, hypoxia, wheezing -> COPD exacerbation (25 Oct 2022 14:00)      INTERVAL HPI/OVERNIGHT EVENTS:    pt feeling well today. seen by pulmonary, may be discharged today.  Saturating well on room air 94-95% RA.  Ambulating without O2 at 91%RA.  Does not hit threshold for Home O2.      Pt's daughter is willing to pay out of pocket.     MEDICATIONS  (STANDING):  ALBUTerol    0.083% 2.5 milliGRAM(s) Nebulizer every 8 hours  amLODIPine   Tablet 5 milliGRAM(s) Oral daily  ascorbic acid 500 milliGRAM(s) Oral daily  aspirin enteric coated 81 milliGRAM(s) Oral daily  budesonide  80 MICROgram(s)/formoterol 4.5 MICROgram(s) Inhaler 2 Puff(s) Inhalation two times a day  clopidogrel Tablet 75 milliGRAM(s) Oral daily  dextrose 5%. 1000 milliLiter(s) (100 mL/Hr) IV Continuous <Continuous>  dextrose 5%. 1000 milliLiter(s) (50 mL/Hr) IV Continuous <Continuous>  dextrose 50% Injectable 25 Gram(s) IV Push once  dextrose 50% Injectable 12.5 Gram(s) IV Push once  dextrose 50% Injectable 25 Gram(s) IV Push once  enoxaparin Injectable 40 milliGRAM(s) SubCutaneous every 24 hours  glucagon  Injectable 1 milliGRAM(s) IntraMuscular once  insulin lispro (ADMELOG) corrective regimen sliding scale   SubCutaneous three times a day before meals  insulin lispro (ADMELOG) corrective regimen sliding scale   SubCutaneous at bedtime  multivitamin 1 Tablet(s) Oral daily  omega-3-Acid Ethyl Esters 2 Gram(s) Oral daily  PARoxetine 20 milliGRAM(s) Oral daily  tiotropium 18 MICROgram(s) Capsule 1 Capsule(s) Inhalation daily    MEDICATIONS  (PRN):  acetaminophen     Tablet .. 650 milliGRAM(s) Oral every 6 hours PRN Temp greater or equal to 38C (100.4F), Mild Pain (1 - 3)  aluminum hydroxide/magnesium hydroxide/simethicone Suspension 30 milliLiter(s) Oral every 4 hours PRN Dyspepsia  dextrose Oral Gel 15 Gram(s) Oral once PRN Blood Glucose LESS THAN 70 milliGRAM(s)/deciliter  melatonin 3 milliGRAM(s) Oral at bedtime PRN Insomnia  ondansetron Injectable 4 milliGRAM(s) IV Push every 8 hours PRN Nausea and/or Vomiting      Allergies    No Known Allergies    Intolerances        REVIEW OF SYSTEMS:  as above    Vital Signs Last 24 Hrs  T(C): 36.9 (25 Oct 2022 05:59), Max: 36.9 (25 Oct 2022 05:59)  T(F): 98.4 (25 Oct 2022 05:59), Max: 98.4 (25 Oct 2022 05:59)  HR: 71 (25 Oct 2022 14:52) (60 - 71)  BP: 170/75 (25 Oct 2022 05:59) (149/61 - 170/75)  BP(mean): --  RR: 18 (25 Oct 2022 05:59) (18 - 18)  SpO2: 94% (25 Oct 2022 14:52) (94% - 95%)    Parameters below as of 25 Oct 2022 14:52  Patient On (Oxygen Delivery Method): room air        PHYSICAL EXAM:  GENERAL: NAD  HEAD:  Atraumatic, Normocephalic  EYES: EOMI, PERRLA, conjunctiva and sclera clear  ENMT: Moist mucous membranes, No lesions; No tonsillar erythema, exudates, or enlargement  NECK: Supple, No JVD, Normal thyroid  NERVOUS SYSTEM:  Alert & Oriented X3, Good concentration; All 4 extremities mobile, no gross sensory deficits.   CHEST/LUNG: Clear to auscultation bilaterally; No rales, rhonchi, wheezing, or rubs  HEART: Regular rate and rhythm; No murmurs, rubs, or gallops  ABDOMEN: Soft, Nontender, Nondistended; Bowel sounds present  EXTREMITIES:  2+ Peripheral Pulses, No clubbing, cyanosis, or edema  Ambulating well    LABS:    25 Oct 2022 12:10    137    |  100    |  30     ----------------------------<  166    5.3     |  32     |  1.27     Ca    9.1        25 Oct 2022 12:10          CAPILLARY BLOOD GLUCOSE      POCT Blood Glucose.: 146 mg/dL (25 Oct 2022 12:40)  POCT Blood Glucose.: 105 mg/dL (25 Oct 2022 07:37)  POCT Blood Glucose.: 157 mg/dL (24 Oct 2022 22:05)  POCT Blood Glucose.: 180 mg/dL (24 Oct 2022 18:28)      RADIOLOGY & ADDITIONAL TESTS:    
Patient is a 76y old  Male who presents with a chief complaint of per H&P "76M with hx of COPD/emphysema, CAD s/p 2x stents, bradycardia s/p PPM, HTN, DM2 not on insulin who presents with SOB, ARNOLD, and wheezing.  Consistent with COPD exacerbation. "     (26 Oct 2022 13:19)      INTERVAL HPI/OVERNIGHT EVENTS:  Patient seen awake and sitting up on edge of bed. He reports productive cough, reports breathing well today. Denies fever or chest pain.  reports home oxygen ordered for today.     MEDICATIONS  (STANDING):  ALBUTerol    0.083% 2.5 milliGRAM(s) Nebulizer every 8 hours  amLODIPine   Tablet 5 milliGRAM(s) Oral daily  ascorbic acid 500 milliGRAM(s) Oral daily  aspirin enteric coated 81 milliGRAM(s) Oral daily  budesonide  80 MICROgram(s)/formoterol 4.5 MICROgram(s) Inhaler 2 Puff(s) Inhalation two times a day  clopidogrel Tablet 75 milliGRAM(s) Oral daily  dextrose 5%. 1000 milliLiter(s) (100 mL/Hr) IV Continuous <Continuous>  dextrose 5%. 1000 milliLiter(s) (50 mL/Hr) IV Continuous <Continuous>  dextrose 50% Injectable 25 Gram(s) IV Push once  dextrose 50% Injectable 12.5 Gram(s) IV Push once  dextrose 50% Injectable 25 Gram(s) IV Push once  enoxaparin Injectable 40 milliGRAM(s) SubCutaneous every 24 hours  glucagon  Injectable 1 milliGRAM(s) IntraMuscular once  insulin lispro (ADMELOG) corrective regimen sliding scale   SubCutaneous three times a day before meals  insulin lispro (ADMELOG) corrective regimen sliding scale   SubCutaneous at bedtime  multivitamin 1 Tablet(s) Oral daily  omega-3-Acid Ethyl Esters 2 Gram(s) Oral daily  PARoxetine 20 milliGRAM(s) Oral daily  tiotropium 18 MICROgram(s) Capsule 1 Capsule(s) Inhalation daily    MEDICATIONS  (PRN):  acetaminophen     Tablet .. 650 milliGRAM(s) Oral every 6 hours PRN Temp greater or equal to 38C (100.4F), Mild Pain (1 - 3)  aluminum hydroxide/magnesium hydroxide/simethicone Suspension 30 milliLiter(s) Oral every 4 hours PRN Dyspepsia  dextrose Oral Gel 15 Gram(s) Oral once PRN Blood Glucose LESS THAN 70 milliGRAM(s)/deciliter  melatonin 3 milliGRAM(s) Oral at bedtime PRN Insomnia  ondansetron Injectable 4 milliGRAM(s) IV Push every 8 hours PRN Nausea and/or Vomiting      Allergies    No Known Allergies    Intolerances        REVIEW OF SYSTEMS:  CONSTITUTIONAL: No fever, weight loss, or fatigue  EYES: No eye pain, visual disturbances, or discharge  ENMT:  No difficulty hearing, tinnitus, vertigo; No sinus or throat pain  NECK: No pain or stiffness  RESPIRATORY: No cough, wheezing, chills or hemoptysis; No shortness of breath  CARDIOVASCULAR: No chest pain, palpitations, lightheadedness, or leg swelling  GASTROINTESTINAL: No abdominal or epigastric pain. No nausea, vomiting, or hematemesis; No diarrhea or constipation. No melena or hematochezia.  GENITOURINARY: No dysuria, frequency, hematuria, or incontinence  NEUROLOGICAL: No headaches, memory loss, vertigo, loss of strength, numbness, or tremors  SKIN: No itching, burning, rashes, or lesions   MUSCULOSKELETAL: No joint pain or swelling; No muscle, back, or extremity pain      PHYSICAL EXAM:  GENERAL: NAD, well-groomed, well-developed  HEAD:  Atraumatic, Normocephalic  EYES: EOMI, PERRLA, conjunctiva and sclera clear  ENMT: Moist mucous membranes, Good dentition, No lesions  NECK: Supple, No JVD appreciated  NERVOUS SYSTEM:  Alert & Oriented X3, Good concentration; All 4 extremities mobile, no gross sensory deficits.   CHEST/LUNG: Diminished to auscultation bilaterally; No rales, rhonchi, wheezing, or rubs appreciated  HEART: Regular rate and rhythm; No murmurs, rubs, or gallops  ABDOMEN: Soft, Nontender, Nondistended; Bowel sounds present  EXTREMITIES:  No clubbing, cyanosis, or edema appreciated  LYMPH: No lymphadenopathy noted  SKIN: No rashes or lesions appreciated    Vital Signs Last 24 Hrs  T(C): 36.7 (26 Oct 2022 14:25), Max: 37.1 (25 Oct 2022 18:13)  T(F): 98 (26 Oct 2022 14:25), Max: 98.8 (25 Oct 2022 18:13)  HR: 76 (26 Oct 2022 15:44) (61 - 76)  BP: 159/76 (26 Oct 2022 14:25) (152/70 - 168/71)  BP(mean): --  RR: 18 (26 Oct 2022 14:25) (17 - 18)  SpO2: 94% (26 Oct 2022 15:44) (94% - 95%)    Parameters below as of 26 Oct 2022 15:44  Patient On (Oxygen Delivery Method): room air        LABS:      Ca    9.1        25 Oct 2022 12:10          CAPILLARY BLOOD GLUCOSE      POCT Blood Glucose.: 119 mg/dL (26 Oct 2022 12:07)  POCT Blood Glucose.: 92 mg/dL (26 Oct 2022 07:56)  POCT Blood Glucose.: 131 mg/dL (25 Oct 2022 21:09)  POCT Blood Glucose.: 131 mg/dL (25 Oct 2022 17:24)    
Date/Time Patient Seen:  		  Referring MD:   Data Reviewed	       Patient is a 76y old  Male who presents with a chief complaint of SOB, hypoxia, wheezing -> COPD exacerbation (24 Oct 2022 16:30)      Subjective/HPI     PAST MEDICAL & SURGICAL HISTORY:  H/O bundle branch block    Pacemaker    COPD, moderate    Emphysema/COPD    Hypertension    Borderline type 2 diabetes mellitus    CAD (coronary artery disease)  s/p 2 stents    Pacemaker    History of cholecystectomy    History of appendectomy          Medication list         MEDICATIONS  (STANDING):  ALBUTerol    0.083% 2.5 milliGRAM(s) Nebulizer every 8 hours  amLODIPine   Tablet 5 milliGRAM(s) Oral daily  ascorbic acid 500 milliGRAM(s) Oral daily  aspirin enteric coated 81 milliGRAM(s) Oral daily  budesonide  80 MICROgram(s)/formoterol 4.5 MICROgram(s) Inhaler 2 Puff(s) Inhalation two times a day  clopidogrel Tablet 75 milliGRAM(s) Oral daily  dextrose 5%. 1000 milliLiter(s) (100 mL/Hr) IV Continuous <Continuous>  dextrose 5%. 1000 milliLiter(s) (50 mL/Hr) IV Continuous <Continuous>  dextrose 50% Injectable 25 Gram(s) IV Push once  dextrose 50% Injectable 12.5 Gram(s) IV Push once  dextrose 50% Injectable 25 Gram(s) IV Push once  enoxaparin Injectable 40 milliGRAM(s) SubCutaneous every 24 hours  glucagon  Injectable 1 milliGRAM(s) IntraMuscular once  insulin lispro (ADMELOG) corrective regimen sliding scale   SubCutaneous three times a day before meals  insulin lispro (ADMELOG) corrective regimen sliding scale   SubCutaneous at bedtime  lactated ringers. 1000 milliLiter(s) (75 mL/Hr) IV Continuous <Continuous>  multivitamin 1 Tablet(s) Oral daily  omega-3-Acid Ethyl Esters 2 Gram(s) Oral daily  PARoxetine 20 milliGRAM(s) Oral daily  predniSONE   Tablet 50 milliGRAM(s) Oral daily  tiotropium 18 MICROgram(s) Capsule 1 Capsule(s) Inhalation daily    MEDICATIONS  (PRN):  acetaminophen     Tablet .. 650 milliGRAM(s) Oral every 6 hours PRN Temp greater or equal to 38C (100.4F), Mild Pain (1 - 3)  aluminum hydroxide/magnesium hydroxide/simethicone Suspension 30 milliLiter(s) Oral every 4 hours PRN Dyspepsia  dextrose Oral Gel 15 Gram(s) Oral once PRN Blood Glucose LESS THAN 70 milliGRAM(s)/deciliter  melatonin 3 milliGRAM(s) Oral at bedtime PRN Insomnia  ondansetron Injectable 4 milliGRAM(s) IV Push every 8 hours PRN Nausea and/or Vomiting         Vitals log        ICU Vital Signs Last 24 Hrs  T(C): 36.9 (25 Oct 2022 05:59), Max: 36.9 (25 Oct 2022 05:59)  T(F): 98.4 (25 Oct 2022 05:59), Max: 98.4 (25 Oct 2022 05:59)  HR: 63 (25 Oct 2022 05:59) (63 - 71)  BP: 170/75 (25 Oct 2022 05:59) (149/61 - 176/75)  BP(mean): --  ABP: --  ABP(mean): --  RR: 18 (25 Oct 2022 05:59) (18 - 18)  SpO2: 95% (25 Oct 2022 05:59) (94% - 99%)    O2 Parameters below as of 25 Oct 2022 05:59  Patient On (Oxygen Delivery Method): room air                 Input and Output:  I&O's Detail      Lab Data                        11.3   11.16 )-----------( 168      ( 24 Oct 2022 05:45 )             34.3     10-24    142  |  106  |  34<H>  ----------------------------<  103<H>  3.8   |  28  |  1.40<H>    Ca    8.3<L>      24 Oct 2022 05:45    TPro  6.2  /  Alb  3.0<L>  /  TBili  0.3  /  DBili  x   /  AST  21  /  ALT  13  /  AlkPhos  68  10-24            Review of Systems	      Objective     Physical Examination    heart s1s2  lung dec BS  head nc      Pertinent Lab findings & Imaging      Debra:  NO   Adequate UO     I&O's Detail           Discussed with:     Cultures:	        Radiology                            
Date/Time Patient Seen:  		  Referring MD:   Data Reviewed	       Patient is a 76y old  Male who presents with a chief complaint of SOB, hypoxia, wheezing -> COPD exacerbation (25 Oct 2022 16:55)      Subjective/HPI     PAST MEDICAL & SURGICAL HISTORY:  H/O bundle branch block    Pacemaker    COPD, moderate    Emphysema/COPD    Hypertension    Borderline type 2 diabetes mellitus    CAD (coronary artery disease)  s/p 2 stents    Pacemaker    History of cholecystectomy    History of appendectomy          Medication list         MEDICATIONS  (STANDING):  ALBUTerol    0.083% 2.5 milliGRAM(s) Nebulizer every 8 hours  amLODIPine   Tablet 5 milliGRAM(s) Oral daily  ascorbic acid 500 milliGRAM(s) Oral daily  aspirin enteric coated 81 milliGRAM(s) Oral daily  budesonide  80 MICROgram(s)/formoterol 4.5 MICROgram(s) Inhaler 2 Puff(s) Inhalation two times a day  clopidogrel Tablet 75 milliGRAM(s) Oral daily  dextrose 5%. 1000 milliLiter(s) (100 mL/Hr) IV Continuous <Continuous>  dextrose 5%. 1000 milliLiter(s) (50 mL/Hr) IV Continuous <Continuous>  dextrose 50% Injectable 25 Gram(s) IV Push once  dextrose 50% Injectable 12.5 Gram(s) IV Push once  dextrose 50% Injectable 25 Gram(s) IV Push once  enoxaparin Injectable 40 milliGRAM(s) SubCutaneous every 24 hours  glucagon  Injectable 1 milliGRAM(s) IntraMuscular once  insulin lispro (ADMELOG) corrective regimen sliding scale   SubCutaneous three times a day before meals  insulin lispro (ADMELOG) corrective regimen sliding scale   SubCutaneous at bedtime  multivitamin 1 Tablet(s) Oral daily  omega-3-Acid Ethyl Esters 2 Gram(s) Oral daily  PARoxetine 20 milliGRAM(s) Oral daily  tiotropium 18 MICROgram(s) Capsule 1 Capsule(s) Inhalation daily    MEDICATIONS  (PRN):  acetaminophen     Tablet .. 650 milliGRAM(s) Oral every 6 hours PRN Temp greater or equal to 38C (100.4F), Mild Pain (1 - 3)  aluminum hydroxide/magnesium hydroxide/simethicone Suspension 30 milliLiter(s) Oral every 4 hours PRN Dyspepsia  dextrose Oral Gel 15 Gram(s) Oral once PRN Blood Glucose LESS THAN 70 milliGRAM(s)/deciliter  melatonin 3 milliGRAM(s) Oral at bedtime PRN Insomnia  ondansetron Injectable 4 milliGRAM(s) IV Push every 8 hours PRN Nausea and/or Vomiting         Vitals log        ICU Vital Signs Last 24 Hrs  T(C): 37 (26 Oct 2022 05:04), Max: 37.1 (25 Oct 2022 18:13)  T(F): 98.6 (26 Oct 2022 05:04), Max: 98.8 (25 Oct 2022 18:13)  HR: 61 (26 Oct 2022 05:04) (60 - 71)  BP: 152/70 (26 Oct 2022 05:04) (152/70 - 168/71)  BP(mean): --  ABP: --  ABP(mean): --  RR: 18 (26 Oct 2022 05:04) (17 - 18)  SpO2: 94% (26 Oct 2022 05:04) (94% - 95%)    O2 Parameters below as of 26 Oct 2022 05:04  Patient On (Oxygen Delivery Method): room air                 Input and Output:  I&O's Detail      Lab Data    10-25    137  |  100  |  30<H>  ----------------------------<  166<H>  5.3   |  32<H>  |  1.27    Ca    9.1      25 Oct 2022 12:10              Review of Systems	      Objective     Physical Examination    heart s1s2  lung dec BS  head nc      Pertinent Lab findings & Imaging      Debra:  NO   Adequate UO     I&O's Detail           Discussed with:     Cultures:	        Radiology                            
Date/Time Patient Seen:  		  Referring MD:   Data Reviewed	       Patient is a 76y old  Male who presents with a chief complaint of SOB, hypoxia, wheezing -> COPD exacerbation (22 Oct 2022 08:23)      Subjective/HPI     PAST MEDICAL & SURGICAL HISTORY:  H/O bundle branch block    Pacemaker    COPD, moderate    Emphysema/COPD    Hypertension    Borderline type 2 diabetes mellitus    CAD (coronary artery disease)  s/p 2 stents    Pacemaker    History of cholecystectomy    History of appendectomy          Medication list         MEDICATIONS  (STANDING):  ALBUTerol    0.083% 2.5 milliGRAM(s) Nebulizer every 8 hours  amLODIPine   Tablet 5 milliGRAM(s) Oral daily  ascorbic acid 500 milliGRAM(s) Oral daily  aspirin enteric coated 81 milliGRAM(s) Oral daily  budesonide  80 MICROgram(s)/formoterol 4.5 MICROgram(s) Inhaler 2 Puff(s) Inhalation two times a day  clopidogrel Tablet 75 milliGRAM(s) Oral daily  dextrose 5%. 1000 milliLiter(s) (100 mL/Hr) IV Continuous <Continuous>  dextrose 5%. 1000 milliLiter(s) (50 mL/Hr) IV Continuous <Continuous>  dextrose 50% Injectable 25 Gram(s) IV Push once  dextrose 50% Injectable 12.5 Gram(s) IV Push once  dextrose 50% Injectable 25 Gram(s) IV Push once  enoxaparin Injectable 40 milliGRAM(s) SubCutaneous every 24 hours  glucagon  Injectable 1 milliGRAM(s) IntraMuscular once  insulin lispro (ADMELOG) corrective regimen sliding scale   SubCutaneous three times a day before meals  insulin lispro (ADMELOG) corrective regimen sliding scale   SubCutaneous at bedtime  lactated ringers. 1000 milliLiter(s) (75 mL/Hr) IV Continuous <Continuous>  multivitamin 1 Tablet(s) Oral daily  omega-3-Acid Ethyl Esters 2 Gram(s) Oral daily  PARoxetine 20 milliGRAM(s) Oral daily  predniSONE   Tablet 50 milliGRAM(s) Oral daily  tiotropium 18 MICROgram(s) Capsule 1 Capsule(s) Inhalation daily    MEDICATIONS  (PRN):  acetaminophen     Tablet .. 650 milliGRAM(s) Oral every 6 hours PRN Temp greater or equal to 38C (100.4F), Mild Pain (1 - 3)  aluminum hydroxide/magnesium hydroxide/simethicone Suspension 30 milliLiter(s) Oral every 4 hours PRN Dyspepsia  dextrose Oral Gel 15 Gram(s) Oral once PRN Blood Glucose LESS THAN 70 milliGRAM(s)/deciliter  melatonin 3 milliGRAM(s) Oral at bedtime PRN Insomnia  ondansetron Injectable 4 milliGRAM(s) IV Push every 8 hours PRN Nausea and/or Vomiting         Vitals log        ICU Vital Signs Last 24 Hrs  T(C): 36.3 (23 Oct 2022 05:04), Max: 36.7 (22 Oct 2022 15:44)  T(F): 97.4 (23 Oct 2022 05:04), Max: 98 (22 Oct 2022 15:44)  HR: 61 (23 Oct 2022 05:04) (61 - 88)  BP: 148/67 (23 Oct 2022 05:04) (134/64 - 187/87)  BP(mean): --  ABP: --  ABP(mean): --  RR: 20 (23 Oct 2022 05:04) (18 - 24)  SpO2: 97% (23 Oct 2022 05:04) (93% - 99%)    O2 Parameters below as of 23 Oct 2022 05:04  Patient On (Oxygen Delivery Method): room air                 Input and Output:  I&O's Detail      Lab Data                        12.5   4.57  )-----------( 181      ( 22 Oct 2022 07:57 )             38.6     10-22    136  |  101  |  20  ----------------------------<  156<H>  4.1   |  25  |  1.06    Ca    8.5      22 Oct 2022 07:57  Phos  2.0     10-22  Mg     1.6     10-22    TPro  6.2  /  Alb  2.9<L>  /  TBili  0.6  /  DBili  x   /  AST  11  /  ALT  <10<L>  /  AlkPhos  82  10-22            Review of Systems	      Objective     Physical Examination    heart s1s2  lung dec BS  head nc  verbal      Pertinent Lab findings & Imaging      Debra:  NO   Adequate UO     I&O's Detail           Discussed with:     Cultures:	        Radiology                            
Patient is a 76y old  Male who presents with a chief complaint of SOB, hypoxia, wheezing -> COPD exacerbation (24 Oct 2022 07:02)      INTERVAL HPI/OVERNIGHT EVENTS:    seen in AM and afternoon.  daughter at bedside.  Pt feels better but desatted last night. currently on room air saturating 93%    MEDICATIONS  (STANDING):  ALBUTerol    0.083% 2.5 milliGRAM(s) Nebulizer every 8 hours  amLODIPine   Tablet 5 milliGRAM(s) Oral daily  ascorbic acid 500 milliGRAM(s) Oral daily  aspirin enteric coated 81 milliGRAM(s) Oral daily  budesonide  80 MICROgram(s)/formoterol 4.5 MICROgram(s) Inhaler 2 Puff(s) Inhalation two times a day  clopidogrel Tablet 75 milliGRAM(s) Oral daily  dextrose 5%. 1000 milliLiter(s) (100 mL/Hr) IV Continuous <Continuous>  dextrose 5%. 1000 milliLiter(s) (50 mL/Hr) IV Continuous <Continuous>  dextrose 50% Injectable 25 Gram(s) IV Push once  dextrose 50% Injectable 12.5 Gram(s) IV Push once  dextrose 50% Injectable 25 Gram(s) IV Push once  enoxaparin Injectable 40 milliGRAM(s) SubCutaneous every 24 hours  glucagon  Injectable 1 milliGRAM(s) IntraMuscular once  insulin lispro (ADMELOG) corrective regimen sliding scale   SubCutaneous three times a day before meals  insulin lispro (ADMELOG) corrective regimen sliding scale   SubCutaneous at bedtime  lactated ringers. 1000 milliLiter(s) (75 mL/Hr) IV Continuous <Continuous>  multivitamin 1 Tablet(s) Oral daily  omega-3-Acid Ethyl Esters 2 Gram(s) Oral daily  PARoxetine 20 milliGRAM(s) Oral daily  predniSONE   Tablet 50 milliGRAM(s) Oral daily  tiotropium 18 MICROgram(s) Capsule 1 Capsule(s) Inhalation daily    MEDICATIONS  (PRN):  acetaminophen     Tablet .. 650 milliGRAM(s) Oral every 6 hours PRN Temp greater or equal to 38C (100.4F), Mild Pain (1 - 3)  aluminum hydroxide/magnesium hydroxide/simethicone Suspension 30 milliLiter(s) Oral every 4 hours PRN Dyspepsia  dextrose Oral Gel 15 Gram(s) Oral once PRN Blood Glucose LESS THAN 70 milliGRAM(s)/deciliter  melatonin 3 milliGRAM(s) Oral at bedtime PRN Insomnia  ondansetron Injectable 4 milliGRAM(s) IV Push every 8 hours PRN Nausea and/or Vomiting      Allergies    No Known Allergies    Intolerances        REVIEW OF SYSTEMS:  CONSTITUTIONAL: No fever, weight loss, or fatigue  rest of negative    Vital Signs Last 24 Hrs  T(C): 36.6 (24 Oct 2022 09:22), Max: 36.9 (23 Oct 2022 14:00)  T(F): 97.9 (24 Oct 2022 09:22), Max: 98.4 (23 Oct 2022 14:00)  HR: 69 (24 Oct 2022 09:22) (69 - 84)  BP: 176/75 (24 Oct 2022 09:22) (118/58 - 176/75)  BP(mean): --  RR: 18 (24 Oct 2022 09:22) (18 - 18)  SpO2: 96% (24 Oct 2022 09:22) (94% - 96%)    Parameters below as of 24 Oct 2022 09:22  Patient On (Oxygen Delivery Method): room air    PHYSICAL EXAM:  GENERAL: NAD  HEAD:  Atraumatic, Normocephalic  EYES: EOMI, PERRLA, conjunctiva and sclera clear  ENMT: Moist mucous membranes, No lesions; No tonsillar erythema, exudates, or enlargement  NECK: Supple, No JVD, Normal thyroid  NERVOUS SYSTEM:  Alert & Oriented X3, Good concentration; All 4 extremities mobile, no gross sensory deficits.   CHEST/LUNG: Clear to auscultation bilaterally; No rales, rhonchi, wheezing, or rubs  HEART: Regular rate and rhythm; No murmurs, rubs, or gallops  ABDOMEN: Soft, Nontender, Nondistended; Bowel sounds present  EXTREMITIES:  2+ Peripheral Pulses, No clubbing, cyanosis, or edema    LABS:                        11.3   11.16 )-----------( 168      ( 24 Oct 2022 05:45 )             34.3     24 Oct 2022 05:45    142    |  106    |  34     ----------------------------<  103    3.8     |  28     |  1.40     Ca    8.3        24 Oct 2022 05:45    TPro  6.2    /  Alb  3.0    /  TBili  0.3    /  DBili  x      /  AST  21     /  ALT  13     /  AlkPhos  68     24 Oct 2022 05:45        CAPILLARY BLOOD GLUCOSE      POCT Blood Glucose.: 165 mg/dL (24 Oct 2022 12:51)  POCT Blood Glucose.: 137 mg/dL (24 Oct 2022 08:30)  POCT Blood Glucose.: 118 mg/dL (23 Oct 2022 22:09)  POCT Blood Glucose.: 149 mg/dL (23 Oct 2022 17:16)      RADIOLOGY & ADDITIONAL TESTS:

## 2022-10-26 NOTE — PROGRESS NOTE ADULT - ASSESSMENT
76M with hx of COPD/emphysema, CAD s/p 2x stents, bradycardia s/p PPM, HTN, DM2 not on insulin who presents with SOB, ARNOLD, and wheezing.     copd  copd ex eval  emphysema  cad  panfilo  PPM  HTN  DM  Asbestos exp hx      follow up with Dr Ashlie Jaramillo in [place]  completed Prednisone taper  cm follow up noted  dc planning    no evidence of PNA  CTA chest -   albuterol - symbicort - spiriva - s/p systemic steroids  VBG -   proBNP noted - repeat noted -   biomarkers -   monitor VS and HD and Sat  keep sat > 88 pct  pt follows with Dr Ashlie Jaramillo in [place] -

## 2022-10-26 NOTE — DIETITIAN INITIAL EVALUATION ADULT - ORAL INTAKE PTA/DIET HISTORY
Reported not following low/no sugar diet at home for 6 months now, appetite/po intake was good. Was taking multivitamin, no other nutrition supplements reported.  Reported following low/no sugar diet at home for 6 months now, appetite/po intake was good. Was taking multivitamin, no other nutrition supplements reported.

## 2022-10-26 NOTE — DISCHARGE NOTE NURSING/CASE MANAGEMENT/SOCIAL WORK - PATIENT PORTAL LINK FT
You can access the FollowMyHealth Patient Portal offered by [facility] by registering at the following website: [email or web address]. By joining [employer]’s FollowMyHealth portal, you will also be able to view your health information using other applications (apps) compatible with our system.

## 2022-10-26 NOTE — DIETITIAN INITIAL EVALUATION ADULT - PERTINENT MEDS FT
MEDICATIONS  (STANDING):  ALBUTerol    0.083% 2.5 milliGRAM(s) Nebulizer every 8 hours  amLODIPine   Tablet 5 milliGRAM(s) Oral daily  ascorbic acid 500 milliGRAM(s) Oral daily  aspirin enteric coated 81 milliGRAM(s) Oral daily  budesonide  80 MICROgram(s)/formoterol 4.5 MICROgram(s) Inhaler 2 Puff(s) Inhalation two times a day  clopidogrel Tablet 75 milliGRAM(s) Oral daily  dextrose 5%. 1000 milliLiter(s) (100 mL/Hr) IV Continuous <Continuous>  dextrose 5%. 1000 milliLiter(s) (50 mL/Hr) IV Continuous <Continuous>  dextrose 50% Injectable 25 Gram(s) IV Push once  dextrose 50% Injectable 12.5 Gram(s) IV Push once  dextrose 50% Injectable 25 Gram(s) IV Push once  enoxaparin Injectable 40 milliGRAM(s) SubCutaneous every 24 hours  glucagon  Injectable 1 milliGRAM(s) IntraMuscular once  insulin lispro (ADMELOG) corrective regimen sliding scale   SubCutaneous three times a day before meals  insulin lispro (ADMELOG) corrective regimen sliding scale   SubCutaneous at bedtime  multivitamin 1 Tablet(s) Oral daily  omega-3-Acid Ethyl Esters 2 Gram(s) Oral daily  PARoxetine 20 milliGRAM(s) Oral daily  tiotropium 18 MICROgram(s) Capsule 1 Capsule(s) Inhalation daily    MEDICATIONS  (PRN):  acetaminophen     Tablet .. 650 milliGRAM(s) Oral every 6 hours PRN Temp greater or equal to 38C (100.4F), Mild Pain (1 - 3)  aluminum hydroxide/magnesium hydroxide/simethicone Suspension 30 milliLiter(s) Oral every 4 hours PRN Dyspepsia  dextrose Oral Gel 15 Gram(s) Oral once PRN Blood Glucose LESS THAN 70 milliGRAM(s)/deciliter  melatonin 3 milliGRAM(s) Oral at bedtime PRN Insomnia  ondansetron Injectable 4 milliGRAM(s) IV Push every 8 hours PRN Nausea and/or Vomiting

## 2022-10-26 NOTE — DIETITIAN INITIAL EVALUATION ADULT - OTHER INFO
Visited patient in room, presents with good appetite/po intake, consuming >75% of meals. Denies n/v/d/c, last BM today. No reported difficulty chewing or swallowing. NKFA. Reported # 6-7 months ago with diet control/planned, current adm weight 199#,  51#/20% weight loss is clinically significant, will continue to monitor weight trends as able.     Pertinent medications/nutrition labs reviewed; noted elevated BUN, FS x24hr WNL, noted ordered lispro sliding scale to aid in glycemic control; Pt hx of DM, doesn't check FS at home anymore, A1c 5.9% indicating good control of blood glucose.     Discussed the importance of consistent carbohydrate intake with pt. Educated on carbohydrate counting, avoidance of concentrated sweets/beverages. Also educated on DASH diet, continue with current diet order to promote intake. Pt receptive, no nutrition related questions at this time. RD to remain available as needed.

## 2022-10-26 NOTE — PROGRESS NOTE ADULT - REASON FOR ADMISSION
SOB, hypoxia, wheezing -> COPD exacerbation

## 2022-11-09 ENCOUNTER — APPOINTMENT (OUTPATIENT)
Dept: CARE COORDINATION | Facility: HOME HEALTH | Age: 77
End: 2022-11-09

## 2023-09-27 NOTE — ED PROVIDER NOTE - DATE/TIME 1
22-Oct-2022 00:09
PAST MEDICAL HISTORY:  Constipation     COVID-19 vaccine series completed     History of COVID-19     History of eczema     History of elevated PSA     History of erectile dysfunction     History of gastritis     History of gastroesophageal reflux (GERD)     History of irregular heartbeat     History of kidney stones     History of pneumonia     History of snoring     Hyperlipidemia     Hypertension     Lumbar disc herniation     Lumbar spinal stenosis     Mild coronary artery disease     Mild emphysema     Primary osteoarthritis of left hip     Primary osteoarthritis of lumbar spine     Primary osteoarthritis of right hip     Sciatic pain, right

## 2024-04-02 PROBLEM — I25.10 ATHEROSCLEROTIC HEART DISEASE OF NATIVE CORONARY ARTERY WITHOUT ANGINA PECTORIS: Chronic | Status: ACTIVE | Noted: 2022-10-22

## 2024-04-02 PROBLEM — Z95.0 PRESENCE OF CARDIAC PACEMAKER: Chronic | Status: ACTIVE | Noted: 2022-10-21

## 2024-04-02 PROBLEM — I10 ESSENTIAL (PRIMARY) HYPERTENSION: Chronic | Status: ACTIVE | Noted: 2022-10-22

## 2024-04-02 PROBLEM — Z86.79 PERSONAL HISTORY OF OTHER DISEASES OF THE CIRCULATORY SYSTEM: Chronic | Status: ACTIVE | Noted: 2022-10-21

## 2024-04-02 PROBLEM — J43.9 EMPHYSEMA, UNSPECIFIED: Chronic | Status: ACTIVE | Noted: 2022-10-22

## 2024-04-02 PROBLEM — J44.9 CHRONIC OBSTRUCTIVE PULMONARY DISEASE, UNSPECIFIED: Chronic | Status: ACTIVE | Noted: 2022-10-21

## 2024-04-02 PROBLEM — R73.03 PREDIABETES: Chronic | Status: ACTIVE | Noted: 2022-10-22

## 2024-05-13 ENCOUNTER — NON-APPOINTMENT (OUTPATIENT)
Age: 79
End: 2024-05-13

## 2024-05-13 DIAGNOSIS — I25.10 ATHEROSCLEROTIC HEART DISEASE OF NATIVE CORONARY ARTERY W/OUT ANGINA PECTORIS: ICD-10-CM

## 2024-05-13 DIAGNOSIS — M25.50 PAIN IN UNSPECIFIED JOINT: ICD-10-CM

## 2024-05-13 DIAGNOSIS — I10 ESSENTIAL (PRIMARY) HYPERTENSION: ICD-10-CM

## 2024-05-13 DIAGNOSIS — Z86.39 PERSONAL HISTORY OF OTHER ENDOCRINE, NUTRITIONAL AND METABOLIC DISEASE: ICD-10-CM

## 2024-05-13 DIAGNOSIS — I50.9 HEART FAILURE, UNSPECIFIED: ICD-10-CM

## 2024-05-13 DIAGNOSIS — G47.00 INSOMNIA, UNSPECIFIED: ICD-10-CM

## 2024-06-03 ENCOUNTER — APPOINTMENT (OUTPATIENT)
Dept: PULMONOLOGY | Facility: CLINIC | Age: 79
End: 2024-06-03
Payer: MEDICARE

## 2024-06-03 ENCOUNTER — NON-APPOINTMENT (OUTPATIENT)
Age: 79
End: 2024-06-03

## 2024-06-03 VITALS
HEART RATE: 89 BPM | SYSTOLIC BLOOD PRESSURE: 119 MMHG | OXYGEN SATURATION: 92 % | DIASTOLIC BLOOD PRESSURE: 65 MMHG | RESPIRATION RATE: 16 BRPM

## 2024-06-03 VITALS — BODY MASS INDEX: 31.34 KG/M2 | HEIGHT: 66 IN | WEIGHT: 195 LBS

## 2024-06-03 DIAGNOSIS — J34.9 UNSPECIFIED DISORDER OF NOSE AND NASAL SINUSES: ICD-10-CM

## 2024-06-03 DIAGNOSIS — J44.89 OTHER SPECIFIED CHRONIC OBSTRUCTIVE PULMONARY DISEASE: ICD-10-CM

## 2024-06-03 DIAGNOSIS — Z00.00 ENCOUNTER FOR GENERAL ADULT MEDICAL EXAMINATION W/OUT ABNORMAL FINDINGS: ICD-10-CM

## 2024-06-03 DIAGNOSIS — Z77.098 CONTACT WITH AND (SUSPECTED) EXPOSURE TO OTHER HAZARDOUS, CHIEFLY NONMEDICINAL, CHEMICALS: ICD-10-CM

## 2024-06-03 DIAGNOSIS — R82.90 UNSPECIFIED ABNORMAL FINDINGS IN URINE: ICD-10-CM

## 2024-06-03 DIAGNOSIS — F43.10 POST-TRAUMATIC STRESS DISORDER, UNSPECIFIED: ICD-10-CM

## 2024-06-03 LAB
BILIRUB UR QL STRIP: NORMAL
CLARITY UR: CLEAR
COLLECTION METHOD: NORMAL
GLUCOSE UR-MCNC: 500
HCG UR QL: 0.2 EU/DL
HGB UR QL STRIP.AUTO: NORMAL
KETONES UR-MCNC: NORMAL
LEUKOCYTE ESTERASE UR QL STRIP: NORMAL
NITRITE UR QL STRIP: NORMAL
PH UR STRIP: 5.5
POCT - HEMOGLOBIN (HGB), QUANTITATIVE, TRANSCUTANEOUS: 13.2
PROT UR STRIP-MCNC: NORMAL
SP GR UR STRIP: 1.01

## 2024-06-03 PROCEDURE — 81003 URINALYSIS AUTO W/O SCOPE: CPT | Mod: QW

## 2024-06-03 PROCEDURE — 99205 OFFICE O/P NEW HI 60 MIN: CPT | Mod: 25

## 2024-06-03 PROCEDURE — 94060 EVALUATION OF WHEEZING: CPT

## 2024-06-03 PROCEDURE — 71046 X-RAY EXAM CHEST 2 VIEWS: CPT

## 2024-06-03 PROCEDURE — 94727 GAS DIL/WSHOT DETER LNG VOL: CPT

## 2024-06-03 PROCEDURE — 93000 ELECTROCARDIOGRAM COMPLETE: CPT

## 2024-06-03 PROCEDURE — 94729 DIFFUSING CAPACITY: CPT

## 2024-06-03 PROCEDURE — 36415 COLL VENOUS BLD VENIPUNCTURE: CPT

## 2024-06-03 PROCEDURE — 88738 HGB QUANT TRANSCUTANEOUS: CPT

## 2024-06-03 RX ORDER — VALSARTAN AND HYDROCHLOROTHIAZIDE 80; 12.5 MG/1; MG/1
80-12.5 TABLET, FILM COATED ORAL
Refills: 0 | Status: ACTIVE | COMMUNITY
Start: 2024-06-03

## 2024-06-03 RX ORDER — ASCORBIC ACID 500 MG
500 TABLET ORAL DAILY
Qty: 2 | Refills: 0 | Status: ACTIVE | COMMUNITY
Start: 2024-06-03

## 2024-06-03 RX ORDER — METFORMIN ER 500 MG 500 MG/1
500 TABLET ORAL DAILY
Refills: 0 | Status: ACTIVE | COMMUNITY
Start: 2024-06-03

## 2024-06-03 RX ORDER — KRILL/OM-3/DHA/EPA/PHOSPHO/AST 1000-230MG
81 CAPSULE ORAL DAILY
Refills: 0 | Status: ACTIVE | COMMUNITY
Start: 2024-06-03

## 2024-06-03 RX ORDER — CELECOXIB 200 MG/1
200 CAPSULE ORAL DAILY
Refills: 0 | Status: ACTIVE | COMMUNITY
Start: 2024-06-03

## 2024-06-03 RX ORDER — AMPICILLIN TRIHYDRATE 500 MG
450 CAPSULE ORAL
Refills: 0 | Status: ACTIVE | COMMUNITY
Start: 2024-06-03

## 2024-06-03 RX ORDER — ATORVASTATIN CALCIUM 40 MG/1
40 TABLET, FILM COATED ORAL
Qty: 90 | Refills: 1 | Status: ACTIVE | COMMUNITY
Start: 2024-06-03

## 2024-06-03 RX ORDER — CLOPIDOGREL 75 MG/1
75 TABLET, FILM COATED ORAL DAILY
Refills: 0 | Status: ACTIVE | COMMUNITY
Start: 2024-06-03

## 2024-06-03 RX ORDER — PAROXETINE HYDROCHLORIDE 20 MG/1
20 TABLET, FILM COATED ORAL DAILY
Refills: 0 | Status: ACTIVE | COMMUNITY
Start: 2024-06-03

## 2024-06-03 RX ORDER — GARLIC 1000 MG
1000 CAPSULE ORAL
Refills: 0 | Status: ACTIVE | COMMUNITY
Start: 2024-06-03

## 2024-06-03 RX ORDER — OMEGA-3/DHA/EPA/FISH OIL 1200 MG
1200 CAPSULE ORAL
Refills: 0 | Status: ACTIVE | COMMUNITY
Start: 2024-06-03

## 2024-06-03 RX ORDER — AMLODIPINE BESYLATE 5 MG/1
5 TABLET ORAL
Qty: 90 | Refills: 3 | Status: ACTIVE | COMMUNITY
Start: 2024-06-03

## 2024-06-03 NOTE — HISTORY OF PRESENT ILLNESS
[Former] : former [>= 20 pack years] : >= 20 pack years [Never] : never [TextBox_4] : [employer] 911 certification [employer]  exposure Certification diagnosis Asthma COPD chronic hypoxemic Respiratory failure Hx Asbestosis exposure  Sinus disease with postnasal drip GERD Depression/posttraumatic stress disorder Past medical history Coronary artery disease diabetes mellitus Hypertension Hyperlipidemia Congestive heart failure Poor Sleep hygiene  Medications valsartan Lexiscan atorvastatin Paroxetine Lasix albuterol Breztri inhaler Nasal Flonase Generic Plavix [place]  Family history Mother  cancer Father  emphysema 2 siblings age 80 and 84  Allergy to penicillin  System review Low respiratory symptoms Reports positive cough wheeze or shortness of breath chest tightness Reports a prior history of pneumonia Asthma COPD Upper respiratory symptoms reports positive sinus congestion postnasal drip voice change throat irritation nasal soreness  Cardiovascular reports positive chest pressure chest pain heart palpitations Gastrointestinal reports that when an acid taste in mouth coughing supine with associated reflux heartburn consistent with GERD  Social history  Reports non-smoker reports no alcohol use Positive posttraumatic stress disorder [TextBox_11] : 2 [YearQuit] : 1984 [TextBox_29] : [place] about [place] for total 40 year pack hx

## 2024-06-03 NOTE — DISCUSSION/SUMMARY
[FreeTextEntry1] : [employer] 911 certification [employer] 9/11 exposure Certification diagnosis asthma COPD Chronic hypoxemic respiratory failure PFT/pulmonary physiology severe obstructive ventilatory impairment with air trapping severe gas exchange impairment reactive airways component positive bronchodilator response History of a component of asbestos exposure Sinus disease with postnasal drip GERD Depression posttraumatic stress disorder Past medical history coronary artery disease PTCA permanent pacemaker Diabetes mellitus Hypertension Hyperlipidemia Congestive heart failure  Recommendations Follow-up with pulmonary Follow-up with cardiology Follow-up with primary care physician Maintain up-to-date vaccine protocol including but not limited to COVID flu pneumonia RSV shingles Continue oxygen therapy as previously ordered Continue controller therapy with Breztri

## 2024-06-03 NOTE — PHYSICAL EXAM
[No Acute Distress] : no acute distress [Normal Oropharynx] : normal oropharynx [II] : Mallampati Class: II [Normal Appearance] : normal appearance [Supple] : supple [No Neck Mass] : no neck mass [No JVD] : no jvd [Normal Rate/Rhythm] : normal rate/rhythm [Normal S1, S2] : normal s1, s2 [No Murmurs] : no murmurs [No Rubs] : no rubs [No Gallops] : no gallops [No Resp Distress] : no resp distress [No Abnormalities] : no abnormalities [Benign] : benign [Not Tender] : not tender [Soft] : soft [No HSM] : no hsm [Normal Bowel Sounds] : normal bowel sounds [Normal Gait] : normal gait [No Clubbing] : no clubbing [No Cyanosis] : no cyanosis [No Edema] : no edema [Normal Color/ Pigmentation] : normal color/ pigmentation [No Focal Deficits] : no focal deficits [Oriented x3] : oriented x3 [Normal Affect] : normal affect [TextBox_68] : Bilateral coarse breath sounds with prolonged expiratory phase

## 2024-06-03 NOTE — PROCEDURE
[FreeTextEntry1] : Blood draw  Urine analysis Ericka 3, 2024 trace intact blood with moderate leukocytes  Chest x-ray PA lateral Ericka 3, 2024 Cardiac size is normal Dual-chamber pacemaker 3 wires identified Tenting right hemidiaphragm Increased retrosternal airspace No appreciable dominant pulmonary nodules pleural effusions or pneumothorax Gladys mediastinum otherwise normal  EKG Ericka 3, 2024 Paced rhythm sinus right bundle branch block Left axis bifascicular block Old anterior wall changes  PFT Ericka 3, 2024 Severe obstructive ventilatory impairment Positive bronchodilator sponsor 90% in FEV1 Lung bands are normal Positive air trapping with RV/TLC ratio which is 56% predicted Diffusion 30% predicted with a severe loss of functioning alveolar capillary units Hemoglobin 13.2 Overall impression severe obstructive ventilatory impairment with reactive component and severe gas exchange impairment

## 2024-06-03 NOTE — REVIEW OF SYSTEMS
[Fatigue] : fatigue [Sinus Problems] : sinus problems [Nocturia] : nocturia [Frequency] : dysuria [Depression] : depression [Anxiety] : anxiety [Negative] : Neurologic [Seasonal Allergies] : no seasonal allergies [Diabetes] : no diabetes [Thyroid Problem] : no thyroid problem [TextBox_30] : Per HPI [TextBox_44] : ASHD, CHF PPM PTCA  Stents x2 [TextBox_91] : hand stiffness [TextBox_134] : PTSD [TextBox_141] : Pre DM

## 2024-06-04 ENCOUNTER — NON-APPOINTMENT (OUTPATIENT)
Age: 79
End: 2024-06-04

## 2024-06-04 DIAGNOSIS — N28.9 DISORDER OF KIDNEY AND URETER, UNSPECIFIED: ICD-10-CM

## 2024-06-04 LAB
ALBUMIN SERPL ELPH-MCNC: 4.4 G/DL
ALP BLD-CCNC: 108 U/L
ALT SERPL-CCNC: 13 U/L
ANION GAP SERPL CALC-SCNC: 12 MMOL/L
AST SERPL-CCNC: 13 U/L
BASOPHILS # BLD AUTO: 0.08 K/UL
BASOPHILS NFR BLD AUTO: 1 %
BILIRUB SERPL-MCNC: 0.4 MG/DL
BUN SERPL-MCNC: 39 MG/DL
CALCIUM SERPL-MCNC: 9.7 MG/DL
CHLORIDE SERPL-SCNC: 104 MMOL/L
CHOLEST SERPL-MCNC: 109 MG/DL
CO2 SERPL-SCNC: 27 MMOL/L
CREAT SERPL-MCNC: 1.35 MG/DL
EGFR: 53 ML/MIN/1.73M2
EOSINOPHIL # BLD AUTO: 0.34 K/UL
EOSINOPHIL NFR BLD AUTO: 4.2 %
GLUCOSE SERPL-MCNC: 103 MG/DL
HCT VFR BLD CALC: 39.8 %
HDLC SERPL-MCNC: 45 MG/DL
HGB BLD-MCNC: 12.5 G/DL
IMM GRANULOCYTES NFR BLD AUTO: 0.2 %
LDLC SERPL CALC-MCNC: 47 MG/DL
LYMPHOCYTES # BLD AUTO: 1.69 K/UL
LYMPHOCYTES NFR BLD AUTO: 20.7 %
MAN DIFF?: NORMAL
MCHC RBC-ENTMCNC: 30 PG
MCHC RBC-ENTMCNC: 31.4 GM/DL
MCV RBC AUTO: 95.4 FL
MONOCYTES # BLD AUTO: 0.75 K/UL
MONOCYTES NFR BLD AUTO: 9.2 %
NEUTROPHILS # BLD AUTO: 5.3 K/UL
NEUTROPHILS NFR BLD AUTO: 64.7 %
NONHDLC SERPL-MCNC: 64 MG/DL
PLATELET # BLD AUTO: 241 K/UL
POTASSIUM SERPL-SCNC: 4.6 MMOL/L
PROT SERPL-MCNC: 7.1 G/DL
RBC # BLD: 4.17 M/UL
RBC # FLD: 15.2 %
SODIUM SERPL-SCNC: 143 MMOL/L
TRIGL SERPL-MCNC: 89 MG/DL
WBC # FLD AUTO: 8.18 K/UL

## 2024-06-06 ENCOUNTER — NON-APPOINTMENT (OUTPATIENT)
Age: 79
End: 2024-06-06

## 2024-06-06 DIAGNOSIS — N39.0 URINARY TRACT INFECTION, SITE NOT SPECIFIED: ICD-10-CM

## 2024-06-06 DIAGNOSIS — B95.2 URINARY TRACT INFECTION, SITE NOT SPECIFIED: ICD-10-CM

## 2024-06-07 LAB — BACTERIA UR CULT: ABNORMAL

## 2024-06-07 RX ORDER — NITROFURANTOIN MACROCRYSTALS 100 MG/1
100 CAPSULE ORAL TWICE DAILY
Qty: 14 | Refills: 0 | Status: ACTIVE | COMMUNITY
Start: 2024-06-07 | End: 1900-01-01

## 2024-06-10 ENCOUNTER — APPOINTMENT (OUTPATIENT)
Dept: PULMONOLOGY | Facility: CLINIC | Age: 79
End: 2024-06-10